# Patient Record
Sex: MALE | Race: WHITE | NOT HISPANIC OR LATINO | Employment: OTHER | URBAN - METROPOLITAN AREA
[De-identification: names, ages, dates, MRNs, and addresses within clinical notes are randomized per-mention and may not be internally consistent; named-entity substitution may affect disease eponyms.]

---

## 2023-10-11 ENCOUNTER — SPECIALTY PHARMACY (OUTPATIENT)
Dept: PHARMACY | Facility: CLINIC | Age: 88
End: 2023-10-11

## 2023-10-11 ENCOUNTER — PHARMACY VISIT (OUTPATIENT)
Dept: PHARMACY | Facility: CLINIC | Age: 88
End: 2023-10-11
Payer: COMMERCIAL

## 2023-10-11 PROCEDURE — RXMED WILLOW AMBULATORY MEDICATION CHARGE

## 2023-10-13 ENCOUNTER — HOSPITAL ENCOUNTER (OUTPATIENT)
Dept: CARDIOLOGY | Facility: HOSPITAL | Age: 88
Discharge: HOME | End: 2023-10-13
Payer: MEDICARE

## 2023-10-13 DIAGNOSIS — Z95.810 AICD (AUTOMATIC CARDIOVERTER/DEFIBRILLATOR) PRESENT: ICD-10-CM

## 2023-10-13 DIAGNOSIS — Z95.810 AICD (AUTOMATIC CARDIOVERTER/DEFIBRILLATOR) PRESENT: Primary | ICD-10-CM

## 2023-10-13 DIAGNOSIS — I47.29 VENTRICULAR TACHYCARDIA (PAROXYSMAL) (MULTI): ICD-10-CM

## 2023-10-13 PROCEDURE — 93296 REM INTERROG EVL PM/IDS: CPT

## 2023-10-13 PROCEDURE — 93295 DEV INTERROG REMOTE 1/2/MLT: CPT | Performed by: INTERNAL MEDICINE

## 2023-10-19 ENCOUNTER — APPOINTMENT (OUTPATIENT)
Dept: CARDIOLOGY | Facility: CLINIC | Age: 88
End: 2023-10-19
Payer: MEDICARE

## 2023-11-14 ENCOUNTER — PHARMACY VISIT (OUTPATIENT)
Dept: PHARMACY | Facility: CLINIC | Age: 88
End: 2023-11-14
Payer: COMMERCIAL

## 2023-11-14 ENCOUNTER — SPECIALTY PHARMACY (OUTPATIENT)
Dept: PHARMACY | Facility: CLINIC | Age: 88
End: 2023-11-14

## 2023-11-14 PROCEDURE — RXMED WILLOW AMBULATORY MEDICATION CHARGE

## 2023-11-15 ENCOUNTER — HOSPITAL ENCOUNTER (OUTPATIENT)
Dept: CARDIOLOGY | Facility: HOSPITAL | Age: 88
Discharge: HOME | End: 2023-11-15
Payer: MEDICARE

## 2023-11-15 DIAGNOSIS — Z95.810 AICD (AUTOMATIC CARDIOVERTER/DEFIBRILLATOR) PRESENT: ICD-10-CM

## 2023-11-15 DIAGNOSIS — I47.29 VENTRICULAR TACHYCARDIA (PAROXYSMAL) (MULTI): ICD-10-CM

## 2023-11-21 PROBLEM — I49.3 PVC (PREMATURE VENTRICULAR CONTRACTION): Status: ACTIVE | Noted: 2023-11-21

## 2023-11-21 PROBLEM — Z95.810 AICD (AUTOMATIC CARDIOVERTER/DEFIBRILLATOR) PRESENT: Status: ACTIVE | Noted: 2023-11-21

## 2023-11-21 PROBLEM — D50.0 ANEMIA, BLOOD LOSS: Status: ACTIVE | Noted: 2020-02-05

## 2023-11-21 PROBLEM — K21.9 GASTROESOPHAGEAL REFLUX DISEASE WITHOUT ESOPHAGITIS: Status: ACTIVE | Noted: 2020-02-04

## 2023-11-21 PROBLEM — I47.20 VENTRICULAR TACHYCARDIA (MULTI): Status: ACTIVE | Noted: 2023-11-21

## 2023-11-21 PROBLEM — N40.0 BENIGN PROSTATIC HYPERPLASIA WITHOUT LOWER URINARY TRACT SYMPTOMS: Status: ACTIVE | Noted: 2020-02-04

## 2023-11-21 PROBLEM — K92.2 GI BLEED: Status: ACTIVE | Noted: 2020-02-01

## 2023-11-21 PROBLEM — I07.1 TRICUSPID REGURGITATION: Status: ACTIVE | Noted: 2023-11-21

## 2023-11-21 PROBLEM — I10 ESSENTIAL HYPERTENSION, BENIGN: Status: ACTIVE | Noted: 2020-02-04

## 2023-11-21 PROBLEM — I50.22 CHRONIC SYSTOLIC HEART FAILURE, ACC/AHA STAGE C (MULTI): Status: ACTIVE | Noted: 2023-11-21

## 2023-11-21 PROBLEM — I50.20 HFREF (HEART FAILURE WITH REDUCED EJECTION FRACTION) (MULTI): Status: ACTIVE | Noted: 2023-11-21

## 2023-11-21 PROBLEM — I27.20 PULMONARY HYPERTENSION (MULTI): Status: ACTIVE | Noted: 2020-02-05

## 2023-11-21 PROBLEM — E78.5 HYPERLIPIDEMIA: Status: ACTIVE | Noted: 2023-11-21

## 2023-11-21 PROBLEM — I34.0 MITRAL REGURGITATION: Status: ACTIVE | Noted: 2023-11-21

## 2023-11-21 PROBLEM — J96.01 ACUTE RESPIRATORY FAILURE WITH HYPOXIA (MULTI): Status: ACTIVE | Noted: 2020-02-05

## 2023-11-21 PROBLEM — I35.1 AORTIC VALVE REGURGITATION, NONRHEUMATIC: Status: ACTIVE | Noted: 2023-11-21

## 2023-11-21 PROBLEM — I42.8 NONISCHEMIC CARDIOMYOPATHY (MULTI): Status: ACTIVE | Noted: 2023-11-21

## 2023-11-21 RX ORDER — FUROSEMIDE 20 MG/1
20 TABLET ORAL AS NEEDED
COMMUNITY

## 2023-11-21 RX ORDER — ASPIRIN 81 MG/1
1 TABLET ORAL DAILY
COMMUNITY
Start: 2021-05-27

## 2023-11-21 RX ORDER — ATORVASTATIN CALCIUM 10 MG/1
1 TABLET, FILM COATED ORAL EVERY EVENING
COMMUNITY
Start: 2021-02-13 | End: 2024-02-12

## 2023-11-21 RX ORDER — OMEPRAZOLE 20 MG/1
20 CAPSULE, DELAYED RELEASE ORAL
COMMUNITY

## 2023-11-21 RX ORDER — CELECOXIB 200 MG/1
1 CAPSULE ORAL DAILY
COMMUNITY
Start: 2021-05-10

## 2023-11-21 RX ORDER — GLUCOSAMINE/CHONDR SU A SOD 750-600 MG
1 TABLET ORAL DAILY
COMMUNITY
Start: 2022-01-11

## 2023-11-21 RX ORDER — VIT C/E/ZN/COPPR/LUTEIN/ZEAXAN 250MG-90MG
50 CAPSULE ORAL DAILY
COMMUNITY
Start: 2022-01-11

## 2023-11-21 RX ORDER — CARVEDILOL 12.5 MG/1
0.5 TABLET ORAL
COMMUNITY
Start: 2021-05-10 | End: 2023-12-11

## 2023-11-21 RX ORDER — SPIRONOLACTONE 25 MG/1
25 TABLET ORAL DAILY
COMMUNITY
End: 2023-11-28 | Stop reason: SDUPTHER

## 2023-11-21 RX ORDER — TAMSULOSIN HYDROCHLORIDE 0.4 MG/1
0.4 CAPSULE ORAL DAILY
COMMUNITY

## 2023-11-21 RX ORDER — POTASSIUM CHLORIDE 750 MG/1
20 CAPSULE, EXTENDED RELEASE ORAL DAILY PRN
COMMUNITY
Start: 2023-02-07

## 2023-11-22 ENCOUNTER — OFFICE VISIT (OUTPATIENT)
Dept: CARDIOLOGY | Facility: CLINIC | Age: 88
End: 2023-11-22
Payer: MEDICARE

## 2023-11-22 VITALS
BODY MASS INDEX: 26.21 KG/M2 | WEIGHT: 167 LBS | SYSTOLIC BLOOD PRESSURE: 132 MMHG | HEART RATE: 76 BPM | HEIGHT: 67 IN | DIASTOLIC BLOOD PRESSURE: 66 MMHG

## 2023-11-22 DIAGNOSIS — I42.8 NONISCHEMIC CARDIOMYOPATHY (MULTI): ICD-10-CM

## 2023-11-22 DIAGNOSIS — I50.20 HFREF (HEART FAILURE WITH REDUCED EJECTION FRACTION) (MULTI): ICD-10-CM

## 2023-11-22 DIAGNOSIS — E78.2 MIXED HYPERLIPIDEMIA: ICD-10-CM

## 2023-11-22 DIAGNOSIS — I47.20 VENTRICULAR TACHYCARDIA (MULTI): ICD-10-CM

## 2023-11-22 DIAGNOSIS — I10 ESSENTIAL HYPERTENSION, BENIGN: ICD-10-CM

## 2023-11-22 DIAGNOSIS — I50.22 CHRONIC SYSTOLIC HEART FAILURE, ACC/AHA STAGE C (MULTI): Primary | ICD-10-CM

## 2023-11-22 DIAGNOSIS — I34.0 NONRHEUMATIC MITRAL VALVE REGURGITATION: ICD-10-CM

## 2023-11-22 DIAGNOSIS — I35.1 AORTIC VALVE REGURGITATION, NONRHEUMATIC: ICD-10-CM

## 2023-11-22 DIAGNOSIS — Z95.810 AICD (AUTOMATIC CARDIOVERTER/DEFIBRILLATOR) PRESENT: ICD-10-CM

## 2023-11-22 DIAGNOSIS — I27.20 PULMONARY HYPERTENSION (MULTI): ICD-10-CM

## 2023-11-22 PROCEDURE — 3075F SYST BP GE 130 - 139MM HG: CPT | Performed by: INTERNAL MEDICINE

## 2023-11-22 PROCEDURE — 3078F DIAST BP <80 MM HG: CPT | Performed by: INTERNAL MEDICINE

## 2023-11-22 PROCEDURE — 1160F RVW MEDS BY RX/DR IN RCRD: CPT | Performed by: INTERNAL MEDICINE

## 2023-11-22 PROCEDURE — 1036F TOBACCO NON-USER: CPT | Performed by: INTERNAL MEDICINE

## 2023-11-22 PROCEDURE — 1159F MED LIST DOCD IN RCRD: CPT | Performed by: INTERNAL MEDICINE

## 2023-11-22 PROCEDURE — 99214 OFFICE O/P EST MOD 30 MIN: CPT | Performed by: INTERNAL MEDICINE

## 2023-11-22 NOTE — PROGRESS NOTES
Subjective   Mario Anglin is a 88 y.o. male            HPI     Patient is here for follow-up due to management for chronic systolic heart failure, history of syncope and ventricular tachyarrhythmia, status post AICD, hypertension and hyperlipidemia.  Since last time I saw him which was about a year ago he denies any change in cardiac status or symptoms.  He denies chest pain, palpitation, lightheadedness, dizziness or syncope.  His last echocardiogram continue to demonstrate severe LV systolic dysfunction in the range of 20%.  He is on excellent heart failure therapy and continue to follow with our EP department.    ASSESSMENT:      1. Chronic systolic heart failure due to nonischemic cardiomyopathy currently functional class II. He is on good medical therapy and compensated  2. History of syncope with inducible ventricular tachycardia, status post automatic implanted cardioverter defibrillator implant with no recent documentation of significant tachyarrhythmia and/or device discharge. He continues his long-term follow-up with our EP department  3. Hypertension, controlled.  4. Hyperlipidemia.  No recent lab available to review  5. Mildly overweight. With minor weight loss  6. Mild atherosclerotic coronary disease based on remote heart cath  7. previous treatment for a left arm DVT attributed to IV site. Resolved  8. Prior GI bleed with no clear etiology was found  9. Very minor symptoms of dizziness related to orthostatic hypotension  10. Moderate aortic regurgitation  11. Moderate severe mitral regurgitation evaluated by surgery at [facility] recommended conservative approach considering lack of symptoms and good functional status  12. Previous documentation secondary pulmonary hypertension improved based on recent echo     RECOMMENDATION:     1. The patient counseled regarding losing weight, exercise, and risk factor adjustment.  2. The patient was advised to continue present medical therapy. But I  "advised him if his lightheadedness worsened to notify me and will consider reducing his heart failure therapy  3. We will see the patient back in the office in 1 year with plan to repeat his lab work  4. I advised him to repeat his lab work  5. I advised him to continue his follow-up with our EP department     Review of Systems   All other systems reviewed and are negative.         Visit Vitals  /66 (BP Location: Right arm, Patient Position: Sitting)   Pulse 76   Ht 1.702 m (5' 7\")   Wt 75.8 kg (167 lb)   BMI 26.16 kg/m²   Smoking Status Never   BSA 1.89 m²        Objective   Physical Exam  Constitutional:       Appearance: Normal appearance. He is normal weight.   HENT:      Nose: Nose normal.   Neck:      Vascular: No carotid bruit.   Cardiovascular:      Rate and Rhythm: Normal rate.      Pulses: Normal pulses.      Heart sounds: Normal heart sounds.   Pulmonary:      Effort: Pulmonary effort is normal.   Abdominal:      General: Bowel sounds are normal.      Palpations: Abdomen is soft.   Genitourinary:     Rectum: Normal.   Musculoskeletal:         General: Normal range of motion.      Cervical back: Normal range of motion.      Right lower leg: No edema.      Left lower leg: No edema.   Skin:     General: Skin is warm and dry.   Neurological:      General: No focal deficit present.      Mental Status: He is alert.   Psychiatric:         Mood and Affect: Mood normal.         Behavior: Behavior normal.         Thought Content: Thought content normal.         Judgment: Judgment normal.         Current Medications    Current Outpatient Medications:     aspirin 81 mg EC tablet, Take 1 tablet (81 mg) by mouth once daily., Disp: , Rfl:     atorvastatin (Lipitor) 10 mg tablet, Take 1 tablet (10 mg) by mouth once daily in the evening., Disp: , Rfl:     carvedilol (Coreg) 12.5 mg tablet, Take 0.5 tablets (6.25 mg) by mouth 2 times a day with meals., Disp: , Rfl:     celecoxib (CeleBREX) 200 mg capsule, Take 1 " capsule (200 mg) by mouth once daily., Disp: , Rfl:     cholecalciferol (Vitamin D-3) 25 MCG (1000 UT) capsule, Take 2 capsules (50 mcg) by mouth once daily., Disp: , Rfl:     dapagliflozin propanediol (Farxiga) 10 mg, TAKE ONE (1) TABLET BY MOUTH ONCE DAILY., Disp: 90 tablet, Rfl: 3    furosemide (Lasix) 20 mg tablet, Take 1 tablet (20 mg) by mouth once daily., Disp: , Rfl:     omeprazole (PriLOSEC) 20 mg DR capsule, Take 1 capsule (20 mg) by mouth once daily in the morning. Take before meals., Disp: , Rfl:     potassium chloride ER (Micro-K) 10 mEq ER capsule, Take 2 capsules (20 mEq) by mouth once daily. Take one tablet daily with Furosemide, Disp: , Rfl:     sacubitriL-valsartan (Entresto)  mg tablet, TAKE ONE (1) TABLET BY MOUTH TWICE DAILY., Disp: 180 tablet, Rfl: 3    spironolactone (Aldactone) 25 mg tablet, Take 1 tablet (25 mg) by mouth once daily., Disp: , Rfl:     tamsulosin (Flomax) 0.4 mg 24 hr capsule, Take 1 capsule (0.4 mg) by mouth once daily., Disp: , Rfl:     vitamin E mixed 400 unit capsule, Take 1 capsule by mouth once daily., Disp: , Rfl:                      Assessment/Plan   1. Chronic systolic heart failure, ACC/AHA stage C (CMS/HCC)  Basic Metabolic Panel    Follow Up In Cardiology    Basic Metabolic Panel      2. Nonrheumatic mitral valve regurgitation  Basic Metabolic Panel    Follow Up In Cardiology    Basic Metabolic Panel      3. Aortic valve regurgitation, nonrheumatic  Follow Up In Cardiology      4. Essential hypertension, benign  Basic Metabolic Panel    Basic Metabolic Panel      5. HFrEF (heart failure with reduced ejection fraction) (CMS/HCC)        6. Mixed hyperlipidemia  Alanine Aminotransferase    Aspartate Aminotransferase    Lipid Panel    Alanine Aminotransferase    Aspartate Aminotransferase    Lipid Panel      7. Nonischemic cardiomyopathy (CMS/HCC)        8. AICD (automatic cardioverter/defibrillator) present        9. Pulmonary hypertension (CMS/HCC)        10.  BMI 26.0-26.9,adult        11. Ventricular tachycardia (CMS/HCC)

## 2023-11-28 DIAGNOSIS — I50.20 HFREF (HEART FAILURE WITH REDUCED EJECTION FRACTION) (MULTI): Primary | ICD-10-CM

## 2023-11-28 RX ORDER — SPIRONOLACTONE 25 MG/1
25 TABLET ORAL DAILY
Qty: 30 TABLET | Refills: 0 | Status: SHIPPED | OUTPATIENT
Start: 2023-11-28 | End: 2024-01-04 | Stop reason: SDUPTHER

## 2023-12-05 ENCOUNTER — APPOINTMENT (OUTPATIENT)
Dept: CARDIOLOGY | Facility: HOSPITAL | Age: 88
End: 2023-12-05
Payer: MEDICARE

## 2023-12-05 ENCOUNTER — HOSPITAL ENCOUNTER (OUTPATIENT)
Dept: CARDIOLOGY | Facility: HOSPITAL | Age: 88
Discharge: HOME | End: 2023-12-05
Payer: MEDICARE

## 2023-12-05 DIAGNOSIS — I47.29 VENTRICULAR TACHYCARDIA (PAROXYSMAL) (MULTI): ICD-10-CM

## 2023-12-05 DIAGNOSIS — Z95.810 AICD (AUTOMATIC CARDIOVERTER/DEFIBRILLATOR) PRESENT: ICD-10-CM

## 2023-12-07 ENCOUNTER — SPECIALTY PHARMACY (OUTPATIENT)
Dept: PHARMACY | Facility: CLINIC | Age: 88
End: 2023-12-07

## 2023-12-11 DIAGNOSIS — I50.20 HFREF (HEART FAILURE WITH REDUCED EJECTION FRACTION) (MULTI): Primary | ICD-10-CM

## 2023-12-11 RX ORDER — CARVEDILOL 12.5 MG/1
12.5 TABLET ORAL
Qty: 180 TABLET | Refills: 0 | Status: SHIPPED | OUTPATIENT
Start: 2023-12-11 | End: 2024-01-23 | Stop reason: SDUPTHER

## 2023-12-19 ENCOUNTER — HOSPITAL ENCOUNTER (OUTPATIENT)
Dept: CARDIOLOGY | Facility: HOSPITAL | Age: 88
Discharge: HOME | End: 2023-12-19
Payer: MEDICARE

## 2023-12-19 DIAGNOSIS — Z95.810 AICD (AUTOMATIC CARDIOVERTER/DEFIBRILLATOR) PRESENT: ICD-10-CM

## 2023-12-19 DIAGNOSIS — I47.29 VENTRICULAR TACHYCARDIA (PAROXYSMAL) (MULTI): ICD-10-CM

## 2023-12-19 PROCEDURE — 93296 REM INTERROG EVL PM/IDS: CPT

## 2023-12-19 PROCEDURE — 93295 DEV INTERROG REMOTE 1/2/MLT: CPT | Performed by: INTERNAL MEDICINE

## 2023-12-27 PROCEDURE — RXMED WILLOW AMBULATORY MEDICATION CHARGE

## 2023-12-28 ENCOUNTER — PHARMACY VISIT (OUTPATIENT)
Dept: PHARMACY | Facility: CLINIC | Age: 88
End: 2023-12-28
Payer: COMMERCIAL

## 2024-01-04 DIAGNOSIS — I50.20 HFREF (HEART FAILURE WITH REDUCED EJECTION FRACTION) (MULTI): ICD-10-CM

## 2024-01-04 RX ORDER — SPIRONOLACTONE 25 MG/1
25 TABLET ORAL DAILY
Qty: 90 TABLET | Refills: 0 | Status: SHIPPED | OUTPATIENT
Start: 2024-01-04 | End: 2024-03-29 | Stop reason: SDUPTHER

## 2024-01-23 ENCOUNTER — OFFICE VISIT (OUTPATIENT)
Dept: CARDIOLOGY | Facility: CLINIC | Age: 89
End: 2024-01-23
Payer: MEDICARE

## 2024-01-23 ENCOUNTER — HOSPITAL ENCOUNTER (OUTPATIENT)
Dept: CARDIOLOGY | Facility: HOSPITAL | Age: 89
Discharge: HOME | End: 2024-01-23
Payer: MEDICARE

## 2024-01-23 VITALS
HEART RATE: 71 BPM | HEIGHT: 64 IN | DIASTOLIC BLOOD PRESSURE: 60 MMHG | WEIGHT: 164 LBS | SYSTOLIC BLOOD PRESSURE: 128 MMHG | BODY MASS INDEX: 28 KG/M2

## 2024-01-23 DIAGNOSIS — R55 NEAR SYNCOPE: ICD-10-CM

## 2024-01-23 DIAGNOSIS — Z71.89 ENCOUNTER FOR MEDICATION REVIEW AND COUNSELING: ICD-10-CM

## 2024-01-23 DIAGNOSIS — Z95.810 AICD (AUTOMATIC CARDIOVERTER/DEFIBRILLATOR) PRESENT: Primary | ICD-10-CM

## 2024-01-23 DIAGNOSIS — Z78.9 NEVER SMOKED CIGARETTES: ICD-10-CM

## 2024-01-23 DIAGNOSIS — I47.20 VENTRICULAR TACHYCARDIA (MULTI): ICD-10-CM

## 2024-01-23 DIAGNOSIS — I34.0 NONRHEUMATIC MITRAL VALVE REGURGITATION: ICD-10-CM

## 2024-01-23 DIAGNOSIS — I07.1 TRICUSPID VALVE INSUFFICIENCY, UNSPECIFIED ETIOLOGY: ICD-10-CM

## 2024-01-23 DIAGNOSIS — I42.8 NONISCHEMIC CARDIOMYOPATHY (MULTI): ICD-10-CM

## 2024-01-23 DIAGNOSIS — Z95.810 ICD (IMPLANTABLE CARDIOVERTER-DEFIBRILLATOR) IN PLACE: ICD-10-CM

## 2024-01-23 DIAGNOSIS — I50.20 HFREF (HEART FAILURE WITH REDUCED EJECTION FRACTION) (MULTI): ICD-10-CM

## 2024-01-23 DIAGNOSIS — I47.29 PAROXYSMAL VENTRICULAR TACHYCARDIA (MULTI): ICD-10-CM

## 2024-01-23 DIAGNOSIS — Z71.89 ENCOUNTER TO DISCUSS TREATMENT OPTIONS: ICD-10-CM

## 2024-01-23 DIAGNOSIS — I49.3 PVC (PREMATURE VENTRICULAR CONTRACTION): ICD-10-CM

## 2024-01-23 DIAGNOSIS — E78.2 MIXED HYPERLIPIDEMIA: ICD-10-CM

## 2024-01-23 PROCEDURE — 93000 ELECTROCARDIOGRAM COMPLETE: CPT | Performed by: INTERNAL MEDICINE

## 2024-01-23 PROCEDURE — 99214 OFFICE O/P EST MOD 30 MIN: CPT | Performed by: INTERNAL MEDICINE

## 2024-01-23 PROCEDURE — 93290 INTERROG DEV EVAL ICPMS IP: CPT | Performed by: INTERNAL MEDICINE

## 2024-01-23 PROCEDURE — 1036F TOBACCO NON-USER: CPT | Performed by: INTERNAL MEDICINE

## 2024-01-23 PROCEDURE — 3074F SYST BP LT 130 MM HG: CPT | Performed by: INTERNAL MEDICINE

## 2024-01-23 PROCEDURE — 3078F DIAST BP <80 MM HG: CPT | Performed by: INTERNAL MEDICINE

## 2024-01-23 PROCEDURE — 93283 PRGRMG EVAL IMPLANTABLE DFB: CPT

## 2024-01-23 PROCEDURE — 1159F MED LIST DOCD IN RCRD: CPT | Performed by: INTERNAL MEDICINE

## 2024-01-23 PROCEDURE — 1160F RVW MEDS BY RX/DR IN RCRD: CPT | Performed by: INTERNAL MEDICINE

## 2024-01-23 PROCEDURE — 93283 PRGRMG EVAL IMPLANTABLE DFB: CPT | Performed by: INTERNAL MEDICINE

## 2024-01-23 RX ORDER — CARVEDILOL 12.5 MG/1
18.75 TABLET ORAL
Qty: 270 TABLET | Refills: 3 | Status: SHIPPED | OUTPATIENT
Start: 2024-01-23 | End: 2025-01-17

## 2024-01-23 RX ORDER — LANOLIN ALCOHOL/MO/W.PET/CERES
400 CREAM (GRAM) TOPICAL DAILY
Qty: 90 TABLET | Refills: 3 | Status: SHIPPED | OUTPATIENT
Start: 2024-01-23 | End: 2025-01-22

## 2024-01-23 ASSESSMENT — ENCOUNTER SYMPTOMS
IRREGULAR HEARTBEAT: 0
NEAR-SYNCOPE: 0
DYSPNEA ON EXERTION: 0
SHORTNESS OF BREATH: 0
SYNCOPE: 0
PALPITATIONS: 0
ORTHOPNEA: 0

## 2024-01-23 NOTE — PROGRESS NOTES
"Chief Complaint:   Follow-up (1 year)     History Of Present Illness:    Mario Anglin is a 88 y.o. male presenting with followup.  He is accompanied by family.    He felt unwell on Jan 7; there were no arrhythmias detected on device check on that date.    He did have VT on 12/30, it had fast heart rate and spontaneously converted.    He reports that when refills occurred, his carvedilol was 12.5 mg BID.  Before the refills, he had taken 18.75 mg BID.  Otherwise there has been no change in his medications.     Last Recorded Vitals:  Vitals:    01/23/24 1320   BP: 128/60   Pulse: 71   Weight: 74.4 kg (164 lb)   Height: 1.626 m (5' 4\")       Past Medical History:  See List     Past Surgical History:  See List    Social History:  He reports that he has never smoked. He has never used smokeless tobacco. He reports that he does not drink alcohol and does not use drugs.    Family History:  Family History   Problem Relation Name Age of Onset    Cancer Father          Allergies:  Patient has no known allergies.    Outpatient Medications:  Current Outpatient Medications   Medication Instructions    aspirin 81 mg EC tablet 1 tablet, oral, Daily    atorvastatin (Lipitor) 10 mg tablet 1 tablet, oral, Every evening    carvedilol (COREG) 12.5 mg, oral, 2 times daily with meals    celecoxib (CeleBREX) 200 mg capsule 1 capsule, oral, Daily    cholecalciferol (VITAMIN D-3) 50 mcg, oral, Daily    dapagliflozin propanediol (Farxiga) 10 mg TAKE ONE (1) TABLET BY MOUTH ONCE DAILY.    furosemide (LASIX) 20 mg, oral, As needed    omeprazole (PRILOSEC) 20 mg, oral, Daily before breakfast    potassium chloride ER (Micro-K) 10 mEq ER capsule 20 mEq, oral, Daily PRN, Take one tablet daily with Furosemide<BR>    sacubitriL-valsartan (Entresto)  mg tablet TAKE ONE (1) TABLET BY MOUTH TWICE DAILY.    spironolactone (ALDACTONE) 25 mg, oral, Daily, Must be seen by Dr. Chen for further refills. Call [phone]    tamsulosin (FLOMAX) " "0.4 mg, oral, Daily    vitamin E mixed 400 unit capsule 1 capsule, oral, Daily   Review of Systems   Constitutional: Positive for malaise/fatigue.   Cardiovascular:  Negative for chest pain, dyspnea on exertion, irregular heartbeat, near-syncope, orthopnea, palpitations and syncope.   Respiratory:  Negative for shortness of breath.    All other systems reviewed and are negative.    Physical Exam:  Constitutional:       General: Awake.      Appearance: Normal and healthy appearance. Well-developed and not in distress.   Neck:      Vascular: No JVR. JVD normal.   Pulmonary:      Effort: Pulmonary effort is normal.      Breath sounds: Normal breath sounds. No wheezing. No rhonchi. No rales.   Chest:      Chest wall: Not tender to palpatation.   Cardiovascular:      PMI at left midclavicular line. Normal rate. Regular rhythm. Normal S1. Normal S2.       Murmurs: There is no murmur.      No gallop.  No click. No rub.   Pulses:     Intact distal pulses.   Edema:     Peripheral edema absent.   Abdominal:      Tenderness: There is no abdominal tenderness.   Musculoskeletal: Normal range of motion.         General: No tenderness. Skin:     General: Skin is warm and dry.   Neurological:      General: No focal deficit present.      Mental Status: Alert and oriented to person, place and time.            Last Labs:  CBC -  No results found for: \"WBC\", \"HGB\", \"HCT\", \"MCV\", \"PLT\"    CMP -  Lab Results   Component Value Date    CALCIUM 8.8 07/02/2021       LIPID PANEL -   No results found for: \"CHOL\", \"TRIG\", \"HDL\", \"CHHDL\", \"LDLF\", \"VLDL\", \"NHDL\"    RENAL FUNCTION PANEL -   Lab Results   Component Value Date    GLUCOSE 78 07/02/2021     07/02/2021    K 4.4 07/02/2021     07/02/2021    CO2 29 07/02/2021    ANIONGAP 11 07/02/2021    BUN 25 (H) 07/02/2021    CREATININE 1.35 (H) 07/02/2021    CALCIUM 8.8 07/02/2021        Lab Results   Component Value Date     (H) 05/27/2021       Last Cardiology " Tests:  ECG:  Today.  Appropriate pacing.  Right axis deviation.  Corrected QT interval 460 ms  Device check.  Medtronic DD BB 1 D4 ICD.  Estimated longevity device 9 months.  Episodes of ventricular tachycardia and 1 episode of fast VT that self terminated  No shocks    Lab review: I have personally reviewed the laboratory result(s) see above    Assessment/Plan   Diagnoses and all orders for this visit:  PVC (premature ventricular contraction)  Nonischemic cardiomyopathy (CMS/HCC)  Nonrheumatic mitral valve regurgitation  Tricuspid valve insufficiency, unspecified etiology  AICD (automatic cardioverter/defibrillator) present  Mixed hyperlipidemia  Ventricular tachycardia (CMS/HCC)  HFrEF (heart failure with reduced ejection fraction) (CMS/HCC)  Near syncope  BMI 28.0-28.9,adult  Never smoked cigarettes  Encounter for medication review and counseling  Encounter to discuss treatment options    Joselin Hester RN    Nonischemic cardiomyopathy. Valvular heart disease NYHA II C heart failure. Narrow QRS. Chronic. Stable. LVEF 29% by nuclear stress testing 2020. LVEF 20 to 25% and 20% by echocardiogram 2022. Valvular heart disease with moderate to severe MR, moderate TR, trace to mild AI, and LV dilation. Follows with cardiology.  Nonsustained ventricular tachycardia. Will increase carvedilol to 18.75 mg twice daily.   Orthostatic dizziness. Continue to stagger timing of doses of Entresto and carvedilol given intermittent orthostatic dizziness.  Primary prevention ICD and abnormal AV adali function, abnormal SA adali function by EP testing 2015. Status post ICD 2015. No recurrence of near syncope after defibrillator implant  Medtronic DD BB 1 D4 ICD. Chronic. On field alert. Discussed again the reprogramming of the device and field alert. Discussed standard of care for follow-up. Continue quarterly device checks, alternate remote check with clinic checks paired with EP OV.  Hypertension with LVH. Benign, chronic,  controlled. Stable. Reviewed ECG. Reviewed medications. Continue medications. Discussed refills.  Osteoarthritis limits activity. Stable. Chronic.  Overweight        AHA recommendations for exercise, diet, and behavioral modification reviewed with pt.     The patient and I discussed the mechanism of arrhythmia, ECG, device check, field alert device, VT, half life of medications, indications for and types of medications, discussion if and what medication refills needed, treatment options, risks, benefits, and imponderables. American Heart Association lifestyle changes and behavioral modification discussed. All questions answered in detail. Counseling over 50% visit regarding above. Patient appreciative of care.

## 2024-01-23 NOTE — PATIENT INSTRUCTIONS
Continue same medications/treatment.  Patient educated on proper medication use.  Patient educated on risk factor modification.  Please bring any lab results from other providers/physicians to your next appointment.    Please bring all medicines, vitamins, and herbal supplements with you when you come to the office.    Prescriptions will not be filled unless you are compliant with your follow up appointments or have a follow up appointment scheduled as per instruction of your physician. Refills should be requested at the time of your visit.    START magnesium oxide 400mg daily  INCREASE carvedilol to 1.5 tablets twice daily (18.75mg twice daily)  Follow up with Dr. Andre in 3 months with device check  OBTAIN labs within the next week     HARVEY AUGUSTIN RN, AM SCRIBING FOR AND IN THE PRESENCE OF DR. VANIA ANDRE MD, FACC, FACP, PS

## 2024-02-10 DIAGNOSIS — E78.2 MIXED HYPERLIPIDEMIA: ICD-10-CM

## 2024-02-12 RX ORDER — ATORVASTATIN CALCIUM 10 MG/1
10 TABLET, FILM COATED ORAL NIGHTLY
Qty: 90 TABLET | Refills: 3 | Status: SHIPPED | OUTPATIENT
Start: 2024-02-12 | End: 2025-02-11

## 2024-02-19 ENCOUNTER — SPECIALTY PHARMACY (OUTPATIENT)
Dept: PHARMACY | Facility: CLINIC | Age: 89
End: 2024-02-19

## 2024-02-19 PROCEDURE — RXMED WILLOW AMBULATORY MEDICATION CHARGE

## 2024-02-20 ENCOUNTER — PHARMACY VISIT (OUTPATIENT)
Dept: PHARMACY | Facility: CLINIC | Age: 89
End: 2024-02-20
Payer: COMMERCIAL

## 2024-02-28 ENCOUNTER — HOSPITAL ENCOUNTER (OUTPATIENT)
Dept: CARDIOLOGY | Facility: HOSPITAL | Age: 89
Discharge: HOME | End: 2024-02-28
Payer: MEDICARE

## 2024-02-28 DIAGNOSIS — I47.29 VENTRICULAR TACHYCARDIA (PAROXYSMAL) (MULTI): ICD-10-CM

## 2024-02-28 DIAGNOSIS — Z95.810 AICD (AUTOMATIC CARDIOVERTER/DEFIBRILLATOR) PRESENT: ICD-10-CM

## 2024-03-14 LAB
NON-UH HIE ALANINE AMINOTRANSFERASE: 16 U/L (ref 7–52)
NON-UH HIE ANION GAP: 7.4 (ref 6–15)
NON-UH HIE ASPARTATE AMINO TRANSFERASE: 17 U/L (ref 13–39)
NON-UH HIE BLOOD UREA NITROGEN: 33 MG/DL (ref 7–25)
NON-UH HIE CALCIUM: 9.1 MG/DL (ref 8.6–10.3)
NON-UH HIE CARBON DIOXIDE: 29.8 MMOL/L (ref 21–31)
NON-UH HIE CHLORIDE: 109 MMOL/L (ref 98–107)
NON-UH HIE CHOL/HDL RATIO: 2.9
NON-UH HIE CHOLESTEROL: 124 MG/DL (ref 140–200)
NON-UH HIE CREATININE: 1.38 MG/DL (ref 0.7–1.3)
NON-UH HIE ESTIMATED GFR: 49.8
NON-UH HIE GLUCOSE: 99 MG/DL (ref 70–100)
NON-UH HIE HDL CHOLESTEROL: 43 MG/DL (ref 23–92)
NON-UH HIE LDL CHOLESTEROL,CALCULATED: 57 MG/DL (ref 0–100)
NON-UH HIE POTASSIUM: 5.2 MMOL/L (ref 3.5–5.1)
NON-UH HIE SODIUM: 141 MMOL/L (ref 136–145)
NON-UH HIE TRIGLYCERIDE W/REFLEX: 119 MG/DL (ref 0–149)
NON-UH HIE VLDL CHOLESTEROL: 23 MG/DL

## 2024-03-15 ENCOUNTER — SPECIALTY PHARMACY (OUTPATIENT)
Dept: PHARMACY | Facility: CLINIC | Age: 89
End: 2024-03-15

## 2024-03-15 DIAGNOSIS — I50.20 HFREF (HEART FAILURE WITH REDUCED EJECTION FRACTION) (MULTI): Primary | ICD-10-CM

## 2024-03-18 RX ORDER — SACUBITRIL AND VALSARTAN 97; 103 MG/1; MG/1
1 TABLET, FILM COATED ORAL 2 TIMES DAILY
Qty: 180 TABLET | Refills: 3 | Status: SHIPPED | OUTPATIENT
Start: 2024-03-18 | End: 2025-03-17

## 2024-03-18 RX ORDER — DAPAGLIFLOZIN 10 MG/1
10 TABLET, FILM COATED ORAL DAILY
Qty: 90 TABLET | Refills: 3 | Status: SHIPPED | OUTPATIENT
Start: 2024-03-18 | End: 2025-03-17

## 2024-03-28 ENCOUNTER — HOSPITAL ENCOUNTER (OUTPATIENT)
Dept: CARDIOLOGY | Facility: HOSPITAL | Age: 89
Discharge: HOME | End: 2024-03-28
Payer: MEDICARE

## 2024-03-28 DIAGNOSIS — I47.29 VENTRICULAR TACHYCARDIA (PAROXYSMAL) (MULTI): ICD-10-CM

## 2024-03-28 DIAGNOSIS — Z95.810 AICD (AUTOMATIC CARDIOVERTER/DEFIBRILLATOR) PRESENT: ICD-10-CM

## 2024-03-29 ENCOUNTER — TELEPHONE (OUTPATIENT)
Dept: CARDIOLOGY | Facility: HOSPITAL | Age: 89
End: 2024-03-29
Payer: COMMERCIAL

## 2024-03-29 DIAGNOSIS — I50.20 HFREF (HEART FAILURE WITH REDUCED EJECTION FRACTION) (MULTI): ICD-10-CM

## 2024-03-29 RX ORDER — SPIRONOLACTONE 25 MG/1
25 TABLET ORAL DAILY
Qty: 90 TABLET | Refills: 0 | Status: SHIPPED | OUTPATIENT
Start: 2024-03-29 | End: 2024-06-27

## 2024-04-05 PROCEDURE — RXMED WILLOW AMBULATORY MEDICATION CHARGE

## 2024-04-10 ENCOUNTER — PHARMACY VISIT (OUTPATIENT)
Dept: PHARMACY | Facility: CLINIC | Age: 89
End: 2024-04-10
Payer: COMMERCIAL

## 2024-04-25 ENCOUNTER — APPOINTMENT (OUTPATIENT)
Dept: CARDIOLOGY | Facility: HOSPITAL | Age: 89
End: 2024-04-25
Payer: COMMERCIAL

## 2024-04-30 ENCOUNTER — OFFICE VISIT (OUTPATIENT)
Dept: CARDIOLOGY | Facility: CLINIC | Age: 89
End: 2024-04-30
Payer: MEDICARE

## 2024-04-30 ENCOUNTER — HOSPITAL ENCOUNTER (OUTPATIENT)
Dept: CARDIOLOGY | Facility: HOSPITAL | Age: 89
Discharge: HOME | End: 2024-04-30
Payer: MEDICARE

## 2024-04-30 VITALS
WEIGHT: 163 LBS | DIASTOLIC BLOOD PRESSURE: 60 MMHG | BODY MASS INDEX: 26.2 KG/M2 | HEART RATE: 74 BPM | SYSTOLIC BLOOD PRESSURE: 110 MMHG | HEIGHT: 66 IN

## 2024-04-30 DIAGNOSIS — I47.20 VENTRICULAR TACHYCARDIA (MULTI): ICD-10-CM

## 2024-04-30 DIAGNOSIS — R55 NEAR SYNCOPE: ICD-10-CM

## 2024-04-30 DIAGNOSIS — I49.3 PVC (PREMATURE VENTRICULAR CONTRACTION): ICD-10-CM

## 2024-04-30 DIAGNOSIS — E78.2 MIXED HYPERLIPIDEMIA: ICD-10-CM

## 2024-04-30 DIAGNOSIS — Z95.810 AICD (AUTOMATIC CARDIOVERTER/DEFIBRILLATOR) PRESENT: ICD-10-CM

## 2024-04-30 DIAGNOSIS — I50.22 CHRONIC SYSTOLIC HEART FAILURE, ACC/AHA STAGE C (MULTI): ICD-10-CM

## 2024-04-30 DIAGNOSIS — I42.8 NONISCHEMIC CARDIOMYOPATHY (MULTI): ICD-10-CM

## 2024-04-30 DIAGNOSIS — I34.0 NONRHEUMATIC MITRAL VALVE REGURGITATION: ICD-10-CM

## 2024-04-30 DIAGNOSIS — Z78.9 NEVER SMOKED CIGARETTES: ICD-10-CM

## 2024-04-30 DIAGNOSIS — Z95.810 AICD (AUTOMATIC CARDIOVERTER/DEFIBRILLATOR) PRESENT: Primary | ICD-10-CM

## 2024-04-30 DIAGNOSIS — I07.1 TRICUSPID VALVE INSUFFICIENCY, UNSPECIFIED ETIOLOGY: ICD-10-CM

## 2024-04-30 PROCEDURE — 93283 PRGRMG EVAL IMPLANTABLE DFB: CPT

## 2024-04-30 PROCEDURE — 1124F ACP DISCUSS-NO DSCNMKR DOCD: CPT | Performed by: INTERNAL MEDICINE

## 2024-04-30 PROCEDURE — 93283 PRGRMG EVAL IMPLANTABLE DFB: CPT | Performed by: INTERNAL MEDICINE

## 2024-04-30 PROCEDURE — 99215 OFFICE O/P EST HI 40 MIN: CPT | Performed by: INTERNAL MEDICINE

## 2024-04-30 PROCEDURE — 3078F DIAST BP <80 MM HG: CPT | Performed by: INTERNAL MEDICINE

## 2024-04-30 PROCEDURE — 3074F SYST BP LT 130 MM HG: CPT | Performed by: INTERNAL MEDICINE

## 2024-04-30 PROCEDURE — 1159F MED LIST DOCD IN RCRD: CPT | Performed by: INTERNAL MEDICINE

## 2024-04-30 PROCEDURE — 93000 ELECTROCARDIOGRAM COMPLETE: CPT | Mod: DISTINCT PROCEDURAL SERVICE | Performed by: INTERNAL MEDICINE

## 2024-04-30 PROCEDURE — 1036F TOBACCO NON-USER: CPT | Performed by: INTERNAL MEDICINE

## 2024-04-30 ASSESSMENT — ENCOUNTER SYMPTOMS
PALPITATIONS: 0
DIZZINESS: 1
DYSPNEA ON EXERTION: 0

## 2024-04-30 NOTE — PATIENT INSTRUCTIONS
Continue same medications/treatment.  Patient educated on proper medication use.  Patient educated on risk factor modification.  Please bring any lab results from other providers/physicians to your next appointment.    Please bring all medicines, vitamins, and herbal supplements with you when you come to the office.    Prescriptions will not be filled unless you are compliant with your follow up appointments or have a follow up appointment scheduled as per instruction of your physician. Refills should be requested at the time of your visit.    INCREASE your carvedilol to 1.5 tablet (18.75mg) twice daily  If your systolic BP is under 100, hold your morning blood pressure medications until your BP rises  Follow up with Dr. Valdez in 4 months with device check    I, HARVEY FU RN, AM SCRIBING FOR AND IN THE PRESENCE OF DR. VANIA VALDEZ MD, FACC, FACP, FHRS

## 2024-04-30 NOTE — PROGRESS NOTES
"Chief Complaint:   Pacemaker Check and Follow-up (6 months)     History Of Present Illness:    Mario Anglin is a 88 y.o. male presenting with follow-up.    He feels okay.  He denies any palpitation, lightheadedness, near-syncope, or syncope.    His device site is unchanged    His device is approaching DAVID.  Shared decision making performed.  When device needs to be replaced in future, he opts to have device replaced by me at [place].     Last Recorded Vitals:  Vitals:    04/30/24 1337   BP: 110/60   BP Location: Right arm   Patient Position: Sitting   BP Cuff Size: Adult   Pulse: 74   Weight: 73.9 kg (163 lb)   Height: 1.676 m (5' 6\")       Past Medical History:  See List     Past Surgical History:  See List       Social History:  He reports that he has never smoked. He has never used smokeless tobacco. He reports that he does not drink alcohol and does not use drugs.    Family History:  Family History   Problem Relation Name Age of Onset    Cancer Father Vikas anglin         Allergies:  Patient has no known allergies.    Outpatient Medications:  Current Outpatient Medications   Medication Instructions    aspirin 81 mg EC tablet 1 tablet, oral, Daily    atorvastatin (LIPITOR) 10 mg, oral, Nightly    B complex-vitamin C-folic acid (Nephro-Sonja Rx) 1- mg-mg-mcg tablet 1 tablet, oral, Daily with breakfast    B complex-vitamin C-folic acid (Nephro-Sonja Rx) 1- mg-mg-mcg tablet 1 tablet, oral, Daily with breakfast    carvedilol (COREG) 18.75 mg, oral, 2 times daily with meals    celecoxib (CeleBREX) 200 mg capsule 1 capsule, oral, Daily    cholecalciferol (VITAMIN D-3) 50 mcg, oral, Daily    dapagliflozin propanediol (Farxiga) 10 mg TAKE ONE (1) TABLET BY MOUTH ONCE DAILY.    furosemide (LASIX) 20 mg, oral, As needed    magnesium oxide (MAG-OX) 400 mg, oral, Daily    omeprazole (PRILOSEC) 20 mg, oral, Daily before breakfast    potassium chloride ER (Micro-K) 10 mEq ER capsule 20 mEq, oral, Daily PRN, Take " "one tablet daily with Furosemide<BR>    sacubitriL-valsartan (Entresto)  mg tablet TAKE ONE (1) TABLET BY MOUTH TWICE DAILY.    spironolactone (ALDACTONE) 25 mg, oral, Daily, Must be seen by Dr. Chen for further refills. Call [phone]    tamsulosin (FLOMAX) 0.4 mg, oral, Daily    vitamin E mixed 400 unit capsule 1 capsule, oral, Daily   Review of Systems   Cardiovascular:  Negative for chest pain, dyspnea on exertion and palpitations.   Neurological:  Positive for dizziness.   All other systems reviewed and are negative.        Physical Exam:  Constitutional:       General: Awake.      Appearance: Normal and healthy appearance. Well-developed and not in distress.   Neck:      Vascular: No JVR. JVD normal.   Pulmonary:      Effort: Pulmonary effort is normal.      Breath sounds: Normal breath sounds. No wheezing. No rhonchi. No rales.   Chest:      Chest wall: Not tender to palpatation.      Comments: Left sided device pocket- healed and well approximated. No swelling or hematoma      Cardiovascular:      PMI at left midclavicular line. Normal rate. Regular rhythm. Normal S1. Normal S2.       Murmurs: There is no murmur.      No gallop.  No click. No rub.   Pulses:     Intact distal pulses.   Edema:     Peripheral edema absent.   Abdominal:      Tenderness: There is no abdominal tenderness.   Musculoskeletal: Normal range of motion.         General: No tenderness. Skin:     General: Skin is warm and dry.   Neurological:      General: No focal deficit present.      Mental Status: Alert and oriented to person, place and time.            Last Labs:  CBC -  No results found for: \"WBC\", \"HGB\", \"HCT\", \"MCV\", \"PLT\"    CMP -  Lab Results   Component Value Date    CALCIUM 8.8 07/02/2021       LIPID PANEL -   No results found for: \"CHOL\", \"TRIG\", \"HDL\", \"CHHDL\", \"LDLF\", \"VLDL\", \"NHDL\"    RENAL FUNCTION PANEL -   Lab Results   Component Value Date    GLUCOSE 78 07/02/2021     07/02/2021    K 4.4 07/02/2021 "     07/02/2021    CO2 29 07/02/2021    ANIONGAP 11 07/02/2021    BUN 25 (H) 07/02/2021    CREATININE 1.35 (H) 07/02/2021    CALCIUM 8.8 07/02/2021        Lab Results   Component Value Date     (H) 05/27/2021       Last Cardiology Tests:  ECG:  ECG 12 Lead 01/23/2024  ECG today.  Appropriate pacing.  Normal axis.  Corrected QT interval 440 ms  Device check today.  Medtronic DD BB 1 D4 ICD.  Estimated longevity device 7 months.  82% atrial pacing 0.5% ventricular pacing.  Brief SVT.  1 rapid VT in the VF zone.  Lab review: I have personally reviewed the laboratory result(s) see above    Assessment/Plan   Diagnoses and all orders for this visit:  AICD (automatic cardioverter/defibrillator) present  -     Cardiac Device Check - In Clinic  PVC (premature ventricular contraction)  Nonischemic cardiomyopathy (Multi)  Nonrheumatic mitral valve regurgitation  Tricuspid valve insufficiency, unspecified etiology  Mixed hyperlipidemia  Ventricular tachycardia (Multi)  Chronic systolic heart failure, ACC/AHA stage C (Multi)  Near syncope  Never smoked cigarettes  BMI 26.0-26.9,adult    Joselin Hester RN    Nonischemic cardiomyopathy. Valvular heart disease NYHA II C heart failure. Narrow QRS. Chronic. Stable. LVEF 29% by nuclear stress testing 2020. LVEF 20 to 25% and 20% by echocardiogram 2022. Valvular heart disease with moderate to severe MR, moderate TR, trace to mild AI, and LV dilation. Follows with cardiology.  Nonsustained ventricular tachycardia.  70 send he did not increase his carvedilol since last visit.  Increase carvedilol to 18.75 mg twice daily.  Refills reviewed.  Orthostatic dizziness.  Improved with staggering doses of medications that are vasoactive.  Primary prevention ICD and abnormal AV adali function, abnormal SA adali function by EP testing 2015. Status post ICD 2015.   Medtronic DD BB 1 D4 ICD. Chronic. On field alert.  Shared decision making performed.  [place] decision tool.  All  questions answered.  Treatment options, risk, benefits, and imponderables discussed.  E consent obtained for future generator change.  Once device reaches 3 months longevity, then we will schedule.  Generator change and testing.  Hypertension with LVH. Benign, chronic, controlled. Stable. Reviewed ECG. Reviewed medications. Continue medications.  Refills discussed.  Osteoarthritis limits activity. Stable. Chronic.  Overweight      AHA recommendations for exercise, diet, and behavioral modification reviewed with pt.     The patient and I discussed the mechanism of arrhythmia, ECG, device check, field alert device, VT, increased dose of metoprolol, shared decision making, preoperative cardiac evaluation, E consent,, indications for and types of medications, discussion if and what medication refills needed, treatment options, risks, benefits, and imponderables. American Heart Association lifestyle changes and behavioral modification discussed. All questions answered in detail. Counseling over 50% visit regarding above. Patient appreciative of

## 2024-05-09 ENCOUNTER — SPECIALTY PHARMACY (OUTPATIENT)
Dept: PHARMACY | Facility: CLINIC | Age: 89
End: 2024-05-09

## 2024-05-09 PROCEDURE — RXMED WILLOW AMBULATORY MEDICATION CHARGE

## 2024-05-10 ENCOUNTER — PHARMACY VISIT (OUTPATIENT)
Dept: PHARMACY | Facility: CLINIC | Age: 89
End: 2024-05-10
Payer: COMMERCIAL

## 2024-06-05 ENCOUNTER — HOSPITAL ENCOUNTER (OUTPATIENT)
Dept: CARDIOLOGY | Facility: HOSPITAL | Age: 89
Discharge: HOME | End: 2024-06-05
Payer: MEDICARE

## 2024-06-05 DIAGNOSIS — Z95.810 AICD (AUTOMATIC CARDIOVERTER/DEFIBRILLATOR) PRESENT: ICD-10-CM

## 2024-06-05 DIAGNOSIS — I47.29 VENTRICULAR TACHYCARDIA (PAROXYSMAL) (MULTI): ICD-10-CM

## 2024-06-19 ENCOUNTER — TELEPHONE (OUTPATIENT)
Dept: CARDIOLOGY | Facility: HOSPITAL | Age: 89
End: 2024-06-19
Payer: COMMERCIAL

## 2024-06-19 DIAGNOSIS — I50.20 HFREF (HEART FAILURE WITH REDUCED EJECTION FRACTION) (MULTI): ICD-10-CM

## 2024-06-19 RX ORDER — SPIRONOLACTONE 25 MG/1
25 TABLET ORAL DAILY
Qty: 90 TABLET | Refills: 0 | Status: SHIPPED | OUTPATIENT
Start: 2024-06-19 | End: 2024-09-17

## 2024-07-05 ENCOUNTER — TELEPHONE (OUTPATIENT)
Dept: CARDIOLOGY | Facility: HOSPITAL | Age: 89
End: 2024-07-05
Payer: COMMERCIAL

## 2024-07-08 ENCOUNTER — SPECIALTY PHARMACY (OUTPATIENT)
Dept: PHARMACY | Facility: CLINIC | Age: 89
End: 2024-07-08

## 2024-07-08 PROCEDURE — RXMED WILLOW AMBULATORY MEDICATION CHARGE

## 2024-07-09 ENCOUNTER — HOSPITAL ENCOUNTER (OUTPATIENT)
Dept: CARDIOLOGY | Facility: HOSPITAL | Age: 89
Discharge: HOME | End: 2024-07-09
Payer: MEDICARE

## 2024-07-09 DIAGNOSIS — I47.29 VENTRICULAR TACHYCARDIA (PAROXYSMAL) (MULTI): ICD-10-CM

## 2024-07-09 DIAGNOSIS — Z95.810 AICD (AUTOMATIC CARDIOVERTER/DEFIBRILLATOR) PRESENT: ICD-10-CM

## 2024-07-10 ENCOUNTER — PHARMACY VISIT (OUTPATIENT)
Dept: PHARMACY | Facility: CLINIC | Age: 89
End: 2024-07-10
Payer: COMMERCIAL

## 2024-07-16 ENCOUNTER — APPOINTMENT (OUTPATIENT)
Dept: CARDIOLOGY | Facility: CLINIC | Age: 89
End: 2024-07-16
Payer: COMMERCIAL

## 2024-07-16 VITALS
OXYGEN SATURATION: 92 % | HEART RATE: 88 BPM | WEIGHT: 155 LBS | TEMPERATURE: 98.1 F | HEIGHT: 67 IN | BODY MASS INDEX: 24.33 KG/M2 | DIASTOLIC BLOOD PRESSURE: 57 MMHG | SYSTOLIC BLOOD PRESSURE: 95 MMHG

## 2024-07-16 DIAGNOSIS — R42 DIZZINESS: Primary | ICD-10-CM

## 2024-07-16 DIAGNOSIS — I50.20 HFREF (HEART FAILURE WITH REDUCED EJECTION FRACTION) (MULTI): ICD-10-CM

## 2024-07-16 PROCEDURE — 99215 OFFICE O/P EST HI 40 MIN: CPT | Performed by: INTERNAL MEDICINE

## 2024-07-16 PROCEDURE — 3074F SYST BP LT 130 MM HG: CPT | Performed by: INTERNAL MEDICINE

## 2024-07-16 PROCEDURE — 1036F TOBACCO NON-USER: CPT | Performed by: INTERNAL MEDICINE

## 2024-07-16 PROCEDURE — 3078F DIAST BP <80 MM HG: CPT | Performed by: INTERNAL MEDICINE

## 2024-07-16 PROCEDURE — 1159F MED LIST DOCD IN RCRD: CPT | Performed by: INTERNAL MEDICINE

## 2024-07-16 RX ORDER — METOPROLOL SUCCINATE 50 MG/1
50 TABLET, EXTENDED RELEASE ORAL DAILY
Qty: 90 TABLET | Refills: 3 | Status: SHIPPED | OUTPATIENT
Start: 2024-07-16 | End: 2025-07-16

## 2024-07-16 RX ORDER — SPIRONOLACTONE 25 MG/1
25 TABLET ORAL DAILY
Qty: 90 TABLET | Refills: 3 | Status: SHIPPED | OUTPATIENT
Start: 2024-07-16 | End: 2025-07-16

## 2024-07-16 RX ORDER — CARVEDILOL 12.5 MG/1
12.5 TABLET ORAL
Qty: 180 TABLET | Refills: 3 | Status: CANCELLED | OUTPATIENT
Start: 2024-07-16 | End: 2025-07-16

## 2024-07-16 NOTE — PROGRESS NOTES
"    Heart Failure Cardiology    Mario Anglin is a 88 y.o. male from [facility], retired  (retired at age of 84). Lives alone, wife passed away 2019. Lives in a condo. Here with his adult daughter Rosales.     His primary concern is that when he wakes up in the morning and gets out of bed he gets dizzy, \"my legs don't do what I want them to do,\" and sometimes he falls. He also has headaches when getting up from sleep.    He denies dyspnea on exertion, fatigue, or excessive swelling. He has taken furosemide since early June 2024.    Exam: /58 (BP Location: Right arm, Patient Position: Sitting, BP Cuff Size: Adult)   Pulse 88   Temp 36.7 °C (98.1 °F) (Temporal)   Ht 1.702 m (5' 7\")   Wt 70.3 kg (155 lb)   SpO2 92%   BMI 24.28 kg/m²   Well appearing  CTA  RRR  No LE edema    BP 88/48 sitting, and 95/57 standing    Current Outpatient Medications   Medication Instructions    aspirin 81 mg EC tablet 1 tablet, oral, Daily    atorvastatin (LIPITOR) 10 mg, oral, Nightly    carvedilol (COREG) 18.75 mg, oral, 2 times daily (morning and late afternoon)    celecoxib (CeleBREX) 200 mg capsule 1 capsule, oral, Daily    cholecalciferol (VITAMIN D-3) 50 mcg, oral, Daily    dapagliflozin propanediol (Farxiga) 10 mg TAKE ONE (1) TABLET BY MOUTH ONCE DAILY.    furosemide (LASIX) 20 mg, oral, As needed    magnesium oxide (MAG-OX) 400 mg, oral, Daily    omeprazole (PRILOSEC) 20 mg, oral, Daily before breakfast    potassium chloride ER (Micro-K) 10 mEq ER capsule 20 mEq, oral, Daily PRN, Take one tablet daily with Furosemide<BR>    sacubitriL-valsartan (Entresto)  mg tablet TAKE ONE (1) TABLET BY MOUTH TWICE DAILY.    spironolactone (ALDACTONE) 25 mg, oral, Daily, Must be seen by Dr. Chen for further refills. Call [phone]    tamsulosin (FLOMAX) 0.4 mg, oral, Daily    vitamin E mixed 400 unit capsule 1 capsule, oral, Daily     Past medical history (non-cardiac):  -- Hypertension  -- History of " diverticulitis    Cardiovascular history:  -- Non-ischemic cardiomypathy (prior stress tests negative for ischemia); cause unclear  -- Stage C HFrEF, NYHA class II; LVEF 20-25%, +aortic regurgitation (mild to moderaet AI), +MR  -- He was previously offered MV clip with Dr. Wooten, but he declined  -- Bradycardia 2015 with subsequent implantation of PPM/ICD    Assessment: He has quite low BP's at home, and here in clinic his resting BP is low (SBP 88mmHg). There is no orthostasis. Contributors including (1) gradual reduction in weight of 30 lbs since 2021, (2) aging, (3) tamsulosin use.    Plan:  -- Due to low BP's and dizziness I recommend: switch from carvedilol to metoprolol succinate 50mg every day, reduce dose of Entresto from 97/103mg bid to 49/51mg bid.   -- I explained that Flomax is well known to cause low BP and dizziness, and he will try stopping it to see what happens    Stephane Chen MD, MPH  [facility] Failure [facility] Trans[facility] ([facility])  [facility]  [place], [place]

## 2024-07-16 NOTE — PATIENT INSTRUCTIONS
To reach Dr. Chen's office please call [phone] ([facility]). Fax [phone]. Call [phone] to schedule an appointment. You may also contact the HF RNs at [email or web address]     Thank you for coming to your appointment today. If you have any questions or need cardiac medication refills, please call the Heart Failure Office at [phone] option [phone].      Try to stop taking Tamsulosin (Flomax)  STOP taking Carvedilol 12.5 mg twice daily   Start taking Metoprolol Succinate 50 mg once daily   Reduce dose of Entresto from  to 49-51 mg twice daily   Do not take Potassium when you take the Lasix   Please schedule a follow up visit if needed

## 2024-07-17 PROCEDURE — RXMED WILLOW AMBULATORY MEDICATION CHARGE

## 2024-07-23 ENCOUNTER — SPECIALTY PHARMACY (OUTPATIENT)
Dept: PHARMACY | Facility: CLINIC | Age: 89
End: 2024-07-23

## 2024-07-24 ENCOUNTER — PHARMACY VISIT (OUTPATIENT)
Dept: PHARMACY | Facility: CLINIC | Age: 89
End: 2024-07-24
Payer: COMMERCIAL

## 2024-08-15 ENCOUNTER — HOSPITAL ENCOUNTER (OUTPATIENT)
Dept: CARDIOLOGY | Facility: HOSPITAL | Age: 89
Discharge: HOME | End: 2024-08-15
Payer: MEDICARE

## 2024-08-15 DIAGNOSIS — Z95.810 AICD (AUTOMATIC CARDIOVERTER/DEFIBRILLATOR) PRESENT: ICD-10-CM

## 2024-08-15 DIAGNOSIS — I47.29 VENTRICULAR TACHYCARDIA (PAROXYSMAL) (MULTI): ICD-10-CM

## 2024-08-15 PROCEDURE — 93296 REM INTERROG EVL PM/IDS: CPT

## 2024-08-26 ENCOUNTER — SPECIALTY PHARMACY (OUTPATIENT)
Dept: PHARMACY | Facility: CLINIC | Age: 89
End: 2024-08-26

## 2024-08-26 PROCEDURE — RXMED WILLOW AMBULATORY MEDICATION CHARGE

## 2024-08-27 ENCOUNTER — PHARMACY VISIT (OUTPATIENT)
Dept: PHARMACY | Facility: CLINIC | Age: 89
End: 2024-08-27
Payer: COMMERCIAL

## 2024-09-10 ENCOUNTER — APPOINTMENT (OUTPATIENT)
Dept: CARDIOLOGY | Facility: CLINIC | Age: 89
End: 2024-09-10
Payer: MEDICARE

## 2024-09-10 ENCOUNTER — HOSPITAL ENCOUNTER (OUTPATIENT)
Dept: CARDIOLOGY | Facility: HOSPITAL | Age: 89
Discharge: HOME | End: 2024-09-10
Payer: MEDICARE

## 2024-09-10 VITALS
HEIGHT: 67 IN | SYSTOLIC BLOOD PRESSURE: 108 MMHG | BODY MASS INDEX: 25.43 KG/M2 | WEIGHT: 162 LBS | DIASTOLIC BLOOD PRESSURE: 58 MMHG | HEART RATE: 76 BPM

## 2024-09-10 DIAGNOSIS — R55 NEAR SYNCOPE: ICD-10-CM

## 2024-09-10 DIAGNOSIS — Z95.810 AICD (AUTOMATIC CARDIOVERTER/DEFIBRILLATOR) PRESENT: ICD-10-CM

## 2024-09-10 DIAGNOSIS — E78.2 MIXED HYPERLIPIDEMIA: ICD-10-CM

## 2024-09-10 DIAGNOSIS — Z45.02 IMPLANTABLE CARDIOVERTER-DEFIBRILLATOR (ICD) AT END OF BATTERY LIFE: ICD-10-CM

## 2024-09-10 DIAGNOSIS — Z01.810 PRE-OPERATIVE CARDIOVASCULAR EXAMINATION, SUPRAVENTRICULAR ARRHYTHMIA: ICD-10-CM

## 2024-09-10 DIAGNOSIS — I34.0 NONRHEUMATIC MITRAL VALVE REGURGITATION: ICD-10-CM

## 2024-09-10 DIAGNOSIS — I49.9 PRE-OPERATIVE CARDIOVASCULAR EXAMINATION, SUPRAVENTRICULAR ARRHYTHMIA: ICD-10-CM

## 2024-09-10 DIAGNOSIS — I07.1 TRICUSPID VALVE INSUFFICIENCY, UNSPECIFIED ETIOLOGY: ICD-10-CM

## 2024-09-10 DIAGNOSIS — I50.22 CHRONIC SYSTOLIC HEART FAILURE, ACC/AHA STAGE C (MULTI): ICD-10-CM

## 2024-09-10 DIAGNOSIS — Z71.89 ENCOUNTER TO DISCUSS TREATMENT OPTIONS: ICD-10-CM

## 2024-09-10 DIAGNOSIS — Z71.89 ENCOUNTER FOR MEDICATION REVIEW AND COUNSELING: ICD-10-CM

## 2024-09-10 DIAGNOSIS — I47.20 VENTRICULAR TACHYCARDIA (MULTI): Primary | ICD-10-CM

## 2024-09-10 DIAGNOSIS — I49.3 PVC (PREMATURE VENTRICULAR CONTRACTION): ICD-10-CM

## 2024-09-10 DIAGNOSIS — I42.8 NONISCHEMIC CARDIOMYOPATHY (MULTI): ICD-10-CM

## 2024-09-10 DIAGNOSIS — Z78.9 NEVER SMOKED CIGARETTES: ICD-10-CM

## 2024-09-10 PROCEDURE — 1036F TOBACCO NON-USER: CPT | Performed by: INTERNAL MEDICINE

## 2024-09-10 PROCEDURE — 93000 ELECTROCARDIOGRAM COMPLETE: CPT | Mod: DISTINCT PROCEDURAL SERVICE | Performed by: INTERNAL MEDICINE

## 2024-09-10 PROCEDURE — 93283 PRGRMG EVAL IMPLANTABLE DFB: CPT | Performed by: INTERNAL MEDICINE

## 2024-09-10 PROCEDURE — 1159F MED LIST DOCD IN RCRD: CPT | Performed by: INTERNAL MEDICINE

## 2024-09-10 PROCEDURE — 3078F DIAST BP <80 MM HG: CPT | Performed by: INTERNAL MEDICINE

## 2024-09-10 PROCEDURE — 99215 OFFICE O/P EST HI 40 MIN: CPT | Performed by: INTERNAL MEDICINE

## 2024-09-10 PROCEDURE — 3074F SYST BP LT 130 MM HG: CPT | Performed by: INTERNAL MEDICINE

## 2024-09-10 PROCEDURE — 93283 PRGRMG EVAL IMPLANTABLE DFB: CPT

## 2024-09-10 RX ORDER — CHLORHEXIDINE GLUCONATE 40 MG/ML
SOLUTION TOPICAL ONCE
OUTPATIENT
Start: 2024-09-10 | End: 2024-09-10

## 2024-09-10 RX ORDER — MUPIROCIN 20 MG/G
1 OINTMENT TOPICAL ONCE
OUTPATIENT
Start: 2024-09-10 | End: 2024-09-10

## 2024-09-10 RX ORDER — SODIUM CHLORIDE 9 MG/ML
100 INJECTION, SOLUTION INTRAVENOUS CONTINUOUS
OUTPATIENT
Start: 2024-09-10

## 2024-09-10 ASSESSMENT — ENCOUNTER SYMPTOMS
PALPITATIONS: 0
DYSPNEA ON EXERTION: 0

## 2024-09-10 NOTE — PROGRESS NOTES
"Chief Complaint:   Follow-up (4 month with device check)     History Of Present Illness:    Mario Anglin is a 89 y.o. male presenting with followup.    Patient denies any arrhythmia symptoms of palpitation, lightheadedness, near syncope, or syncope.         Last Recorded Vitals:  Vitals:    09/10/24 1317   BP: 108/58   BP Location: Right arm   Patient Position: Sitting   Pulse: 76   Weight: 73.5 kg (162 lb)   Height: 1.702 m (5' 7\")       Past Medical History:  See List    Past Surgical History:  See List      Social History:  He reports that he has never smoked. He has never used smokeless tobacco. He reports that he does not drink alcohol and does not use drugs.    Family History:  Family History   Problem Relation Name Age of Onset    Cancer Father Vikas anglin         Allergies:  Patient has no known allergies.    Outpatient Medications:  Current Outpatient Medications   Medication Instructions    aspirin 81 mg EC tablet 1 tablet, oral, Daily    atorvastatin (LIPITOR) 10 mg, oral, Nightly    carvedilol (COREG) 18.75 mg, oral, 2 times daily (morning and late afternoon)    celecoxib (CeleBREX) 200 mg capsule 1 capsule, oral, Daily    cholecalciferol (VITAMIN D-3) 50 mcg, oral, Daily    dapagliflozin propanediol (Farxiga) 10 mg TAKE ONE (1) TABLET BY MOUTH ONCE DAILY.    furosemide (LASIX) 20 mg, oral, As needed    magnesium oxide (MAG-OX) 400 mg, oral, Daily    metoprolol succinate XL (TOPROL-XL) 50 mg, oral, Daily, Do not crush or chew.    omeprazole (PRILOSEC) 20 mg, oral, Daily before breakfast    potassium chloride ER (Micro-K) 10 mEq ER capsule 20 mEq, oral, Daily PRN, Take one tablet daily with Furosemide<BR>    sacubitriL-valsartan (Entresto) 49-51 mg tablet 1 tablet, oral, 2 times daily    spironolactone (ALDACTONE) 25 mg, oral, Daily    vitamin E mixed 400 unit capsule 1 capsule, oral, Daily   Review of Systems   Cardiovascular:  Negative for chest pain, dyspnea on exertion and palpitations.   All " "other systems reviewed and are negative.        Physical Exam:  Constitutional:       General: Awake.      Appearance: Normal and healthy appearance. Well-developed and not in distress.   Neck:      Vascular: No JVR. JVD normal.   Pulmonary:      Effort: Pulmonary effort is normal.      Breath sounds: Normal breath sounds. No wheezing. No rhonchi. No rales.   Chest:      Chest wall: Not tender to palpatation.   Cardiovascular:      PMI at left midclavicular line. Normal rate. Regular rhythm. Normal S1. Normal S2.       Murmurs: There is no murmur.      No gallop.  No click. No rub.   Pulses:     Intact distal pulses.   Edema:     Peripheral edema absent.   Abdominal:      Tenderness: There is no abdominal tenderness.   Musculoskeletal: Normal range of motion.         General: No tenderness. Skin:     General: Skin is warm and dry.   Neurological:      General: No focal deficit present.      Mental Status: Alert and oriented to person, place and time.            Last Labs:  CBC -  No results found for: \"WBC\", \"HGB\", \"HCT\", \"MCV\", \"PLT\"    CMP -  Lab Results   Component Value Date    CALCIUM 8.8 07/02/2021       LIPID PANEL -   No results found for: \"CHOL\", \"TRIG\", \"HDL\", \"CHHDL\", \"LDLF\", \"VLDL\", \"NHDL\"    RENAL FUNCTION PANEL -   Lab Results   Component Value Date    GLUCOSE 78 07/02/2021     07/02/2021    K 4.4 07/02/2021     07/02/2021    CO2 29 07/02/2021    ANIONGAP 11 07/02/2021    BUN 25 (H) 07/02/2021    CREATININE 1.35 (H) 07/02/2021    CALCIUM 8.8 07/02/2021        Lab Results   Component Value Date     (H) 05/27/2021       Last Cardiology Tests:  ECG:  ECG 12 lead (Clinic Performed) 04/30/2024    Today. Approp pacing. RAD. Qtc 450 ms. Prior anterior infarct (possible). Nonspecific St and T abnormality. First degree AV block.    Device check today. Medtgronic [id number] estim longevity device 3 months. 83% A pacing. 0.3% V pacing. 5 NSVT.  Lab review: I have personally reviewed the laboratory " result(s) see above    Assessment/Plan   Diagnoses and all orders for this visit:  Ventricular tachycardia (Multi)  Implantable cardioverter-defibrillator (ICD) at end of battery life  AICD (automatic cardioverter/defibrillator) present  PVC (premature ventricular contraction)  Nonischemic cardiomyopathy (Multi)  Nonrheumatic mitral valve regurgitation  Tricuspid valve insufficiency, unspecified etiology  Mixed hyperlipidemia  Chronic systolic heart failure, ACC/AHA stage C (Multi)  Near syncope  Never smoked cigarettes  BMI 25.0-25.9,adult  Encounter for medication review and counseling  Encounter to discuss treatment options        Joselin Hester RN    Nonischemic cardiomyopathy. Valvular heart disease NYHA II C heart failure. Narrow QRS. Chronic. Stable. LVEF 29% by nuclear stress testing 2020. LVEF 20 to 25% and 20% by echocardiogram 2022.   Valvular heart disease with moderate to severe MR, moderate TR, trace to mild AI, and LV dilation. Follows with cardiology.  Nonsustained ventricular tachycardia.  Recurrent episodes. Tolerating metoprolol.  Orthostatic dizziness.  Improved symptomswith staggering doses of medications that are vasoactive.  Primary prevention ICD and abnormal AV adali function, abnormal SA adali function by EP testing 2015. Status post ICD 2015.   Medtronic DD BB 1 D4 ICD. Chronic. On field alert.  Device at DAVID. Shared decision making performed.  [place] decision tool.  All questions answered.  Treatment options, risk, benefits, and imponderables discussed.  E consent obtained for future generator change.    Hypertension with LVH. Benign, chronic, controlled. Stable. Reviewed ECG. Reviewed medications. Continue medications.  Discussed refills.  Osteoarthritis limits activity. Stable. Chronic.  Overweight  AHA recommendations for exercise, diet, and behavioral modification reviewed with pt.     Counseling over 50% visit performed. The patient and I discussed the mechanism of arrhythmia,  ECG, device check, field alert device, DAVID device, generator change, preop cardiac evaluation, indications for and types of medications, discussion if and what medication refills needed, treatment options, risks, benefits, and imponderables. American Heart Association lifestyle changes and behavioral modification discussed. All questions answered in detail. Patient appreciative of care.

## 2024-09-10 NOTE — PATIENT INSTRUCTIONS
Continue same medications/treatment.  Patient educated on proper medication use.  Patient educated on risk factor modification.  Please bring any lab results from other providers/physicians to your next appointment.    Please bring all medicines, vitamins, and herbal supplements with you when you come to the office.    Prescriptions will not be filled unless you are compliant with your follow up appointments or have a follow up appointment scheduled as per instruction of your physician. Refills should be requested at the time of your visit.    SCHEDULE dual chamber ICD generator change out. Obtain labs 1 week prior to procedure. Orders are in the system.   Follow up 7 days after procedure for wound check.   HOLD Aspirin and Vitamin E for 7 days prior to procedure.   HOLD Farxiga for 4 days prior to procedure.     IHARVEY RN, AM SCRIBING FOR AND IN THE PRESENCE OF DR. VANIA VALDEZ MD, FACC, FACP, RS

## 2024-09-11 PROBLEM — Z45.02 IMPLANTABLE CARDIOVERTER-DEFIBRILLATOR (ICD) AT END OF BATTERY LIFE: Status: ACTIVE | Noted: 2024-09-10

## 2024-10-09 LAB
NON-UH HIE ANION GAP: 9.7 (ref 6–15)
NON-UH HIE BASOPHILS # (AUTO): 0.2 10*3/UL (ref 0–0.2)
NON-UH HIE BASOPHILS % (AUTO): 2 %
NON-UH HIE BLOOD UREA NITROGEN: 24 MG/DL (ref 7–25)
NON-UH HIE CALCIUM: 9.1 MG/DL (ref 8.6–10.3)
NON-UH HIE CARBON DIOXIDE: 28.4 MMOL/L (ref 21–31)
NON-UH HIE CHLORIDE: 107 MMOL/L (ref 98–107)
NON-UH HIE CREATININE: 1.4 MG/DL (ref 0.7–1.3)
NON-UH HIE EOSINOPHILS # (AUTO): 0.1 10*3/UL (ref 0–0.45)
NON-UH HIE EOSINOPHILS % (AUTO): 1 %
NON-UH HIE ESTIMATED GFR: 48.65
NON-UH HIE GLUCOSE: 109 MG/DL (ref 70–100)
NON-UH HIE HEMATOCRIT: 35.7 % (ref 38.8–50)
NON-UH HIE HEMOGLOBIN: 12 G/DL (ref 13–17)
NON-UH HIE INR: 1.2
NON-UH HIE LYMPHOCYTES # (AUTO): 1 10*3/UL (ref 1–4.8)
NON-UH HIE LYMPHOCYTES % (AUTO): 12.8 %
NON-UH HIE MEAN CORPUSCULAR HEMOGLOBIN: 30.6 PG (ref 27.5–35.2)
NON-UH HIE MEAN CORPUSCULAR HGB CONC: 33.5 G/DL (ref 32.5–35.6)
NON-UH HIE MEAN CORPUSCULAR VOLUME: 91.2 FL (ref 83.5–101)
NON-UH HIE MEAN PLATELET VOLUME: 9 FL (ref 6.6–10.1)
NON-UH HIE MONOCYTES # (AUTO): 0.6 10*3/UL (ref 0–0.8)
NON-UH HIE MONOCYTES % (AUTO): 8.3 %
NON-UH HIE NEUTROPHILS # (AUTO): 5.7 10*3/UL (ref 1.8–7.7)
NON-UH HIE NEUTROPHILS % (AUTO): 75.9 %
NON-UH HIE NRBC%: 0.4 /100{WBC} (ref 0–0.5)
NON-UH HIE PARTIAL THROMBOPLASTIN TIME: 34.5 S (ref 25.1–36.5)
NON-UH HIE PLATELET COUNT: 721 10*3/UL (ref 150–450)
NON-UH HIE POTASSIUM: 5.1 MMOL/L (ref 3.5–5.1)
NON-UH HIE PROTHROMBIN TIME: 13.3 S (ref 9–12.9)
NON-UH HIE RED BLOOD COUNT: 3.92 (ref 3.9–5.6)
NON-UH HIE RED CELL DISTRIBUTION WIDTH: 15.2 % (ref 12–14.8)
NON-UH HIE SODIUM: 140 MMOL/L (ref 136–145)
NON-UH HIE UNCORRECTED WBC: 7.6 10*3/UL (ref 4.1–10.5)
NON-UH HIE WHITE BLOOD COUNT: 7.6 10*3/UL (ref 4.1–10.5)

## 2024-10-15 ENCOUNTER — ANESTHESIA EVENT (OUTPATIENT)
Dept: CARDIOLOGY | Facility: HOSPITAL | Age: 89
End: 2024-10-15
Payer: MEDICARE

## 2024-10-15 SDOH — HEALTH STABILITY: MENTAL HEALTH: CURRENT SMOKER: 0

## 2024-10-15 NOTE — ANESTHESIA PREPROCEDURE EVALUATION
Mario Anglin is a 89 y.o. male here for:    ICD - Dual Generator Change Out  With Maricarmen Andre MD  Pre-Op Diagnosis Codes:      * Fast heart beat [I47.20]     * Implantable cardioverter-defibrillator (ICD) at end of battery life [Z45.02]    Relevant Problems   Cardiac   (+) AICD (automatic cardioverter/defibrillator) present   (+) Aortic valve regurgitation, nonrheumatic   (+) Essential hypertension, benign   (+) Hyperlipidemia   (+) Mitral regurgitation   (+) PVC (premature ventricular contraction)   (+) Tricuspid regurgitation   (+) Ventricular tachycardia (Multi)      Pulmonary   (+) Pulmonary hypertension (Multi)      GI   (+) GI bleed   (+) Gastroesophageal reflux disease without esophagitis      /Renal   (+) Benign prostatic hyperplasia without lower urinary tract symptoms      Hematology   (+) Anemia, blood loss      HEENT   (+) Anatomical narrow angle borderline glaucoma       Lab Results   Component Value Date     07/02/2021    K 4.4 07/02/2021     07/02/2021    CREATININE 1.35 (H) 07/02/2021    BUN 25 (H) 07/02/2021       Social History     Tobacco Use   Smoking Status Never   Smokeless Tobacco Never       No Known Allergies    Current Outpatient Medications   Medication Instructions   • aspirin 81 mg EC tablet 1 tablet, oral, Daily   • atorvastatin (LIPITOR) 10 mg, oral, Nightly   • carvedilol (COREG) 18.75 mg, oral, 2 times daily (morning and late afternoon)   • celecoxib (CeleBREX) 200 mg capsule 1 capsule, oral, Daily   • cholecalciferol (VITAMIN D-3) 50 mcg, oral, Daily   • dapagliflozin propanediol (Farxiga) 10 mg TAKE ONE (1) TABLET BY MOUTH ONCE DAILY.   • furosemide (LASIX) 20 mg, oral, As needed   • magnesium oxide (MAG-OX) 400 mg, oral, Daily   • metoprolol succinate XL (TOPROL-XL) 50 mg, oral, Daily, Do not crush or chew.   • omeprazole (PRILOSEC) 20 mg, oral, Daily before breakfast   • potassium chloride ER (Micro-K) 10 mEq ER capsule 20 mEq, oral, Daily PRN, Take one tablet  daily with Furosemide<BR>   • sacubitriL-valsartan (Entresto) 49-51 mg tablet 1 tablet, oral, 2 times daily   • spironolactone (ALDACTONE) 25 mg, oral, Daily   • vitamin E mixed 400 unit capsule 1 capsule, oral, Daily       Past Surgical History:   Procedure Laterality Date   • INSERT / REPLACE / REMOVE PACEMAKER  7/23/2015   • OTHER SURGICAL HISTORY  11/30/2021    Cardioverter defibrillator insertion   • OTHER SURGICAL HISTORY  11/30/2021    Knee surgery   • OTHER SURGICAL HISTORY  11/30/2021    Hernia repair   • OTHER SURGICAL HISTORY  11/30/2021    Complete colonoscopy   • OTHER SURGICAL HISTORY  08/09/2022    Cardiac catheterization       Family History   Problem Relation Name Age of Onset   • Cancer Father Vikas virgen        NPO Details:  No data recorded    Physical Exam    Airway  Mallampati: II  TM distance: >3 FB  Neck ROM: full     Cardiovascular    Dental - normal exam     Pulmonary    Abdominal        Anesthesia Plan    History of general anesthesia?: yes  History of complications of general anesthesia?: no    ASA 3     MAC     The patient is not a current smoker.    intravenous induction   Anesthetic plan and risks discussed with patient.    Plan discussed with CRNA.

## 2024-10-16 ENCOUNTER — ANESTHESIA (OUTPATIENT)
Dept: CARDIOLOGY | Facility: HOSPITAL | Age: 89
End: 2024-10-16
Payer: MEDICARE

## 2024-10-16 ENCOUNTER — HOSPITAL ENCOUNTER (OUTPATIENT)
Facility: HOSPITAL | Age: 89
Setting detail: OUTPATIENT SURGERY
Discharge: HOME | End: 2024-10-16
Attending: INTERNAL MEDICINE | Admitting: INTERNAL MEDICINE
Payer: MEDICARE

## 2024-10-16 ENCOUNTER — APPOINTMENT (OUTPATIENT)
Dept: CARDIOLOGY | Facility: HOSPITAL | Age: 89
End: 2024-10-16
Payer: MEDICARE

## 2024-10-16 VITALS
OXYGEN SATURATION: 98 % | BODY MASS INDEX: 25.19 KG/M2 | HEART RATE: 56 BPM | SYSTOLIC BLOOD PRESSURE: 148 MMHG | WEIGHT: 160.5 LBS | HEIGHT: 67 IN | RESPIRATION RATE: 18 BRPM | DIASTOLIC BLOOD PRESSURE: 68 MMHG | TEMPERATURE: 97.5 F

## 2024-10-16 DIAGNOSIS — Z95.810 AICD (AUTOMATIC CARDIOVERTER/DEFIBRILLATOR) PRESENT: ICD-10-CM

## 2024-10-16 DIAGNOSIS — Z45.02 IMPLANTABLE CARDIOVERTER-DEFIBRILLATOR (ICD) AT END OF BATTERY LIFE: ICD-10-CM

## 2024-10-16 DIAGNOSIS — I47.20 VENTRICULAR TACHYCARDIA (MULTI): Primary | ICD-10-CM

## 2024-10-16 LAB
ATRIAL RATE: 82 BPM
P AXIS: -19 DEGREES
P OFFSET: 175 MS
P ONSET: 127 MS
PR INTERVAL: 238 MS
Q ONSET: 216 MS
QRS COUNT: 13 BEATS
QRS DURATION: 120 MS
QT INTERVAL: 378 MS
QTC CALCULATION(BAZETT): 441 MS
QTC FREDERICIA: 419 MS
R AXIS: 26 DEGREES
T AXIS: 164 DEGREES
T OFFSET: 405 MS
VENTRICULAR RATE: 82 BPM

## 2024-10-16 PROCEDURE — 7100000010 HC PHASE TWO TIME - EACH INCREMENTAL 1 MINUTE: Performed by: INTERNAL MEDICINE

## 2024-10-16 PROCEDURE — 99153 MOD SED SAME PHYS/QHP EA: CPT | Performed by: INTERNAL MEDICINE

## 2024-10-16 PROCEDURE — 33263 RMVL & RPLCMT DFB GEN 2 LEAD: CPT | Performed by: INTERNAL MEDICINE

## 2024-10-16 PROCEDURE — 33223 RELOCATE POCKET FOR DEFIB: CPT | Mod: 59 | Performed by: INTERNAL MEDICINE

## 2024-10-16 PROCEDURE — 99152 MOD SED SAME PHYS/QHP 5/>YRS: CPT | Performed by: INTERNAL MEDICINE

## 2024-10-16 PROCEDURE — 2500000004 HC RX 250 GENERAL PHARMACY W/ HCPCS (ALT 636 FOR OP/ED): Performed by: INTERNAL MEDICINE

## 2024-10-16 PROCEDURE — 2500000001 HC RX 250 WO HCPCS SELF ADMINISTERED DRUGS (ALT 637 FOR MEDICARE OP): Performed by: INTERNAL MEDICINE

## 2024-10-16 PROCEDURE — 7100000001 HC RECOVERY ROOM TIME - INITIAL BASE CHARGE: Performed by: INTERNAL MEDICINE

## 2024-10-16 PROCEDURE — 2720000007 HC OR 272 NO HCPCS: Performed by: INTERNAL MEDICINE

## 2024-10-16 PROCEDURE — 2500000004 HC RX 250 GENERAL PHARMACY W/ HCPCS (ALT 636 FOR OP/ED): Performed by: NURSE ANESTHETIST, CERTIFIED REGISTERED

## 2024-10-16 PROCEDURE — 93005 ELECTROCARDIOGRAM TRACING: CPT

## 2024-10-16 PROCEDURE — 3700000001 HC GENERAL ANESTHESIA TIME - INITIAL BASE CHARGE: Performed by: INTERNAL MEDICINE

## 2024-10-16 PROCEDURE — C1721 AICD, DUAL CHAMBER: HCPCS | Performed by: INTERNAL MEDICINE

## 2024-10-16 PROCEDURE — 2780000003 HC OR 278 NO HCPCS: Performed by: INTERNAL MEDICINE

## 2024-10-16 PROCEDURE — 7100000009 HC PHASE TWO TIME - INITIAL BASE CHARGE: Performed by: INTERNAL MEDICINE

## 2024-10-16 PROCEDURE — C1781 MESH (IMPLANTABLE): HCPCS | Performed by: INTERNAL MEDICINE

## 2024-10-16 PROCEDURE — 93287 PERI-PX DEVICE EVAL & PRGR: CPT | Performed by: INTERNAL MEDICINE

## 2024-10-16 PROCEDURE — 2750000001 HC OR 275 NO HCPCS: Performed by: INTERNAL MEDICINE

## 2024-10-16 PROCEDURE — 7100000002 HC RECOVERY ROOM TIME - EACH INCREMENTAL 1 MINUTE: Performed by: INTERNAL MEDICINE

## 2024-10-16 PROCEDURE — 3700000002 HC GENERAL ANESTHESIA TIME - EACH INCREMENTAL 1 MINUTE: Performed by: INTERNAL MEDICINE

## 2024-10-16 PROCEDURE — 93641 EP EVL 1/2CHMB PAC CVDFB TST: CPT | Performed by: INTERNAL MEDICINE

## 2024-10-16 DEVICE — ICD-DR DDMB1D4 EVERA MRI XT US IS1/DF4
Type: IMPLANTABLE DEVICE | Site: CHEST | Status: FUNCTIONAL
Brand: EVERA MRI™ XT DR SURESCAN®

## 2024-10-16 RX ORDER — MIDAZOLAM HYDROCHLORIDE 1 MG/ML
INJECTION, SOLUTION INTRAMUSCULAR; INTRAVENOUS AS NEEDED
Status: DISCONTINUED | OUTPATIENT
Start: 2024-10-16 | End: 2024-10-16 | Stop reason: HOSPADM

## 2024-10-16 RX ORDER — BUPIVACAINE HYDROCHLORIDE 2.5 MG/ML
INJECTION, SOLUTION EPIDURAL; INFILTRATION; INTRACAUDAL AS NEEDED
Status: DISCONTINUED | OUTPATIENT
Start: 2024-10-16 | End: 2024-10-16 | Stop reason: HOSPADM

## 2024-10-16 RX ORDER — ASPIRIN 81 MG/1
81 TABLET ORAL DAILY
Start: 2024-10-16

## 2024-10-16 RX ORDER — MUPIROCIN 20 MG/G
1 OINTMENT TOPICAL ONCE
Status: COMPLETED | OUTPATIENT
Start: 2024-10-16 | End: 2024-10-16

## 2024-10-16 RX ORDER — PROPOFOL 10 MG/ML
INJECTION, EMULSION INTRAVENOUS AS NEEDED
Status: DISCONTINUED | OUTPATIENT
Start: 2024-10-16 | End: 2024-10-16

## 2024-10-16 RX ORDER — LIDOCAINE HYDROCHLORIDE 10 MG/ML
INJECTION, SOLUTION EPIDURAL; INFILTRATION; INTRACAUDAL; PERINEURAL AS NEEDED
Status: DISCONTINUED | OUTPATIENT
Start: 2024-10-16 | End: 2024-10-16 | Stop reason: HOSPADM

## 2024-10-16 RX ORDER — CEFAZOLIN SODIUM 1 G/50ML
1 SOLUTION INTRAVENOUS ONCE
Status: COMPLETED | OUTPATIENT
Start: 2024-10-16 | End: 2024-10-16

## 2024-10-16 RX ORDER — FENTANYL CITRATE 50 UG/ML
INJECTION, SOLUTION INTRAMUSCULAR; INTRAVENOUS AS NEEDED
Status: DISCONTINUED | OUTPATIENT
Start: 2024-10-16 | End: 2024-10-16 | Stop reason: HOSPADM

## 2024-10-16 RX ORDER — ACETAMINOPHEN 325 MG/1
650 TABLET ORAL EVERY 4 HOURS PRN
Status: DISCONTINUED | OUTPATIENT
Start: 2024-10-16 | End: 2024-10-16 | Stop reason: HOSPADM

## 2024-10-16 RX ORDER — CHLORHEXIDINE GLUCONATE 40 MG/ML
SOLUTION TOPICAL ONCE
Status: COMPLETED | OUTPATIENT
Start: 2024-10-16 | End: 2024-10-16

## 2024-10-16 RX ORDER — ONDANSETRON HYDROCHLORIDE 2 MG/ML
4 INJECTION, SOLUTION INTRAVENOUS EVERY 8 HOURS PRN
Status: DISCONTINUED | OUTPATIENT
Start: 2024-10-16 | End: 2024-10-16 | Stop reason: HOSPADM

## 2024-10-16 RX ORDER — GLUCOSAMINE/CHONDR SU A SOD 750-600 MG
1 TABLET ORAL DAILY
Start: 2024-10-16

## 2024-10-16 ASSESSMENT — COLUMBIA-SUICIDE SEVERITY RATING SCALE - C-SSRS
2. HAVE YOU ACTUALLY HAD ANY THOUGHTS OF KILLING YOURSELF?: NO
1. IN THE PAST MONTH, HAVE YOU WISHED YOU WERE DEAD OR WISHED YOU COULD GO TO SLEEP AND NOT WAKE UP?: NO
6. HAVE YOU EVER DONE ANYTHING, STARTED TO DO ANYTHING, OR PREPARED TO DO ANYTHING TO END YOUR LIFE?: NO

## 2024-10-16 ASSESSMENT — ENCOUNTER SYMPTOMS
FEVER: 0
SHORTNESS OF BREATH: 0
PALPITATIONS: 0
FATIGUE: 0
CHILLS: 0

## 2024-10-16 ASSESSMENT — PAIN SCALES - GENERAL
PAINLEVEL_OUTOF10: 0 - NO PAIN
PAINLEVEL_OUTOF10: 0 - NO PAIN
PAIN_LEVEL: 0
PAINLEVEL_OUTOF10: 0 - NO PAIN
PAINLEVEL_OUTOF10: 0 - NO PAIN

## 2024-10-16 ASSESSMENT — PAIN - FUNCTIONAL ASSESSMENT: PAIN_FUNCTIONAL_ASSESSMENT: 0-10

## 2024-10-16 NOTE — Clinical Note
Burst induced V-tach terminated by ICD. 56 Ohms,  15 Joules,  3.3 Seconds. DFT Successful. Patient converted back into NSR

## 2024-10-16 NOTE — H&P
History Of Present Illness  Mario Anglin is a 89 y.o. male presenting with device at replacement indicator.    Patient denies any arrhythmia symptoms of palpitation, lightheadedness, near syncope, or syncope.       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    Personal history of other diseases of the circulatory system 06/11/2021    History of heart failure       Surgical History  Past Surgical History:   Procedure Laterality Date    INSERT / REPLACE / REMOVE PACEMAKER  7/23/2015    OTHER SURGICAL HISTORY  11/30/2021    Cardioverter defibrillator insertion    OTHER SURGICAL HISTORY  11/30/2021    Knee surgery    OTHER SURGICAL HISTORY  11/30/2021    Hernia repair    OTHER SURGICAL HISTORY  11/30/2021    Complete colonoscopy    OTHER SURGICAL HISTORY  08/09/2022    Cardiac catheterization        Social History  He reports that he has never smoked. He has never used smokeless tobacco. He reports that he does not drink alcohol and does not use drugs.    Family History  Family History   Problem Relation Name Age of Onset    Cancer Father Vikas anglin         Allergies  Patient has no known allergies.    Review of Systems   Constitutional:  Negative for chills, fatigue and fever.   Respiratory:  Negative for shortness of breath.    Cardiovascular:  Negative for chest pain and palpitations.   All other systems reviewed and are negative.       Physical Exam  Vitals reviewed.   HENT:      Head: Normocephalic.      Nose: Nose normal.      Mouth/Throat:      Mouth: Mucous membranes are dry.   Eyes:      Extraocular Movements: Extraocular movements intact.   Cardiovascular:      Rate and Rhythm: Normal rate and regular rhythm.      Heart sounds: Normal heart sounds.   Pulmonary:      Breath sounds: Normal breath sounds.   Musculoskeletal:      Cervical back: No rigidity.   Skin:      "General: Skin is dry.   Neurological:      Mental Status: He is alert and oriented to person, place, and time.   Psychiatric:         Behavior: Behavior normal.          Last Recorded Vitals  Blood pressure 148/68, pulse 56, temperature 36.4 °C (97.5 °F), temperature source Temporal, resp. rate 18, height 1.702 m (5' 7\"), weight 72.8 kg (160 lb 7.9 oz), SpO2 98%.    Relevant Results  No results found for: \"WBC\", \"HGB\", \"HCT\", \"MCV\", \"PLT\"   Lab Results   Component Value Date    GLUCOSE 78 07/02/2021    CALCIUM 8.8 07/02/2021     07/02/2021    K 4.4 07/02/2021    CO2 29 07/02/2021     07/02/2021    BUN 25 (H) 07/02/2021    CREATININE 1.35 (H) 07/02/2021      No results found for: \"INR\", \"PROTIME\"        Assessment/Plan   Assessment & Plan  Implantable cardioverter-defibrillator (ICD) at end of battery life    Ventricular tachycardia (Multi)      Nonischemic cardiomyopathy. Valvular heart disease NYHA II C heart failure. Narrow QRS.  LVEF 20% by echocardiogram 2022. Chronic systolic heart failure.  Nonsustained ventricular tachycardia.  Recurrent episodes. Tolerating metoprolol.  Orthostatic dizziness.  Improved symptomswith staggering doses of medications that are vasoactive.  Primary prevention ICD and abnormal AV adali function, abnormal SA adali function by EP testing 2015. Status post ICD 2015.  Device now at DAVID.  Shared decision making performed.  [place] decision tool.  All questions answered.  Treatment options, risk, benefits, and imponderables discussed.  E consent confirmed.     Counseling over 50% visit performed. The patient and I discussed the mechanism of arrhythmia, ECG, device check, field alert device, shared decision making, preop cardiac evaluation, [place] decision tool,  treatment options, risks, benefits, and imponderables. American Heart Association lifestyle changes and behavioral modification discussed. All questions answered in detail. Patient appreciative of care.       I " spent 85 minutes in the professional and overall care of this patient.      Maricarmen Andre MD

## 2024-10-16 NOTE — Clinical Note
The DEFIBRILLATOR, SURESCAN ICD, DUAL CHAMBER, EVERA MRI XT DR  DF4 - [id number] device was inserted. The leads were placed into the connector and visually verified to be in correct position.

## 2024-10-16 NOTE — NURSING NOTE
Reviewed discharge instructions with patient and daughter. All questions answered. Both verbalize understanding of activity restrictions, incisional care, medications and follow-up appointments.

## 2024-10-16 NOTE — ANESTHESIA POSTPROCEDURE EVALUATION
Patient: Mario Anglin    Procedure Summary       Date: 10/16/24 Room / Location: [facility] / Virtual [facility]    Anesthesia Start: 1058 Anesthesia Stop: 1105    Procedure: ICD - Dual Generator Change Out (Left) Diagnosis:       Ventricular tachycardia (Multi)      Implantable cardioverter-defibrillator (ICD) at end of battery life      (Ventricular tachycardia (Multi) [I47.20])      (Implantable cardioverter-defibrillator (ICD) at end of battery life [Z45.02])    Providers: Maricarmen Andre MD Responsible Provider: Juan Pablo Bond MD    Anesthesia Type: MAC ASA Status: 3            Anesthesia Type: MAC    Vitals Value Taken Time   /58 10/16/24 1118   Temp 36 10/16/24 1118   Pulse 53 10/16/24 1118   Resp 16 10/16/24 1118   SpO2 96 10/16/24 1118       Anesthesia Post Evaluation    Patient location during evaluation: PACU  Patient participation: complete - patient participated  Level of consciousness: awake  Pain score: 0  Pain management: adequate  Airway patency: patent  Cardiovascular status: acceptable, hemodynamically stable, blood pressure returned to baseline and stable  Respiratory status: acceptable, face mask, nonlabored ventilation and spontaneous ventilation  Hydration status: acceptable  Postoperative Nausea and Vomiting: none        There were no known notable events for this encounter.

## 2024-10-17 ENCOUNTER — SPECIALTY PHARMACY (OUTPATIENT)
Dept: PHARMACY | Facility: CLINIC | Age: 89
End: 2024-10-17

## 2024-10-17 PROCEDURE — RXMED WILLOW AMBULATORY MEDICATION CHARGE

## 2024-10-18 ENCOUNTER — PHARMACY VISIT (OUTPATIENT)
Dept: PHARMACY | Facility: CLINIC | Age: 89
End: 2024-10-18
Payer: COMMERCIAL

## 2024-10-23 ENCOUNTER — APPOINTMENT (OUTPATIENT)
Dept: CARDIOLOGY | Facility: CLINIC | Age: 89
End: 2024-10-23
Payer: MEDICARE

## 2024-10-23 VITALS
BODY MASS INDEX: 25.27 KG/M2 | SYSTOLIC BLOOD PRESSURE: 140 MMHG | HEART RATE: 60 BPM | WEIGHT: 161 LBS | DIASTOLIC BLOOD PRESSURE: 66 MMHG | HEIGHT: 67 IN | TEMPERATURE: 98.3 F

## 2024-10-23 DIAGNOSIS — R55 NEAR SYNCOPE: ICD-10-CM

## 2024-10-23 DIAGNOSIS — Z45.02 IMPLANTABLE CARDIOVERTER-DEFIBRILLATOR (ICD) AT END OF BATTERY LIFE: ICD-10-CM

## 2024-10-23 DIAGNOSIS — Z95.810 AICD (AUTOMATIC CARDIOVERTER/DEFIBRILLATOR) PRESENT: ICD-10-CM

## 2024-10-23 PROCEDURE — 99024 POSTOP FOLLOW-UP VISIT: CPT | Performed by: INTERNAL MEDICINE

## 2024-10-23 NOTE — PROGRESS NOTES
"Patient here for 1 week Wound check ordered by Dr. Thai VELASCO due to ICD dual chamber generator change. Patient's wound covered by steri strips, insertion site dry to the touch, very minor healing ecchymosis noted around site. No antibiotic taken. Medication list Updated verbally.No cardiac complaints.     To Dr. Thai VELASCO to  read        Vitals:    10/23/24 1101   BP: 140/66   BP Location: Right arm   Patient Position: Sitting   Pulse: 60   Temp: 36.8 °C (98.3 °F)   Weight: 73 kg (161 lb)   Height: 1.702 m (5' 7\")        "

## 2024-11-11 ENCOUNTER — HOSPITAL ENCOUNTER (OUTPATIENT)
Dept: CARDIOLOGY | Facility: HOSPITAL | Age: 89
Discharge: HOME | End: 2024-11-11
Payer: COMMERCIAL

## 2024-11-11 DIAGNOSIS — Z95.810 AICD (AUTOMATIC CARDIOVERTER/DEFIBRILLATOR) PRESENT: ICD-10-CM

## 2024-11-11 DIAGNOSIS — Z95.810 AICD (AUTOMATIC CARDIOVERTER/DEFIBRILLATOR) PRESENT: Primary | ICD-10-CM

## 2024-11-11 DIAGNOSIS — I47.20 VENTRICULAR TACHYCARDIA (MULTI): ICD-10-CM

## 2024-11-25 ENCOUNTER — SPECIALTY PHARMACY (OUTPATIENT)
Dept: PHARMACY | Facility: CLINIC | Age: 89
End: 2024-11-25

## 2024-11-25 PROCEDURE — RXMED WILLOW AMBULATORY MEDICATION CHARGE

## 2024-11-26 ENCOUNTER — APPOINTMENT (OUTPATIENT)
Dept: CARDIOLOGY | Facility: CLINIC | Age: 89
End: 2024-11-26
Payer: COMMERCIAL

## 2024-11-26 VITALS
HEIGHT: 67 IN | WEIGHT: 161 LBS | HEART RATE: 68 BPM | DIASTOLIC BLOOD PRESSURE: 60 MMHG | BODY MASS INDEX: 25.27 KG/M2 | SYSTOLIC BLOOD PRESSURE: 112 MMHG

## 2024-11-26 DIAGNOSIS — I42.8 NONISCHEMIC CARDIOMYOPATHY (MULTI): ICD-10-CM

## 2024-11-26 DIAGNOSIS — I35.1 AORTIC VALVE REGURGITATION, NONRHEUMATIC: ICD-10-CM

## 2024-11-26 DIAGNOSIS — I07.1 TRICUSPID VALVE INSUFFICIENCY, UNSPECIFIED ETIOLOGY: ICD-10-CM

## 2024-11-26 DIAGNOSIS — Z78.9 NEVER SMOKED CIGARETTES: ICD-10-CM

## 2024-11-26 DIAGNOSIS — Z95.810 AICD (AUTOMATIC CARDIOVERTER/DEFIBRILLATOR) PRESENT: ICD-10-CM

## 2024-11-26 DIAGNOSIS — Z45.02 IMPLANTABLE CARDIOVERTER-DEFIBRILLATOR (ICD) AT END OF BATTERY LIFE: ICD-10-CM

## 2024-11-26 DIAGNOSIS — I10 ESSENTIAL HYPERTENSION, BENIGN: ICD-10-CM

## 2024-11-26 DIAGNOSIS — I27.20 PULMONARY HYPERTENSION (MULTI): ICD-10-CM

## 2024-11-26 DIAGNOSIS — I34.0 NONRHEUMATIC MITRAL VALVE REGURGITATION: ICD-10-CM

## 2024-11-26 DIAGNOSIS — I47.20 VENTRICULAR TACHYCARDIA (MULTI): ICD-10-CM

## 2024-11-26 DIAGNOSIS — I50.22 CHRONIC SYSTOLIC HEART FAILURE, ACC/AHA STAGE C: Primary | ICD-10-CM

## 2024-11-26 DIAGNOSIS — I49.3 PVC (PREMATURE VENTRICULAR CONTRACTION): ICD-10-CM

## 2024-11-26 DIAGNOSIS — E78.2 MIXED HYPERLIPIDEMIA: ICD-10-CM

## 2024-11-26 NOTE — PROGRESS NOTES
Subjective   Mario Anglin is a 89 y.o. male       Chief Complaint    Annual Exam          HPI        Patient is here for follow-up to management for chronic systolic heart failure, history of syncope status post AICD implantation, hypertension and hyperlipidemia.  Since last time I saw him he underwent AICD replacement.  He reports he feels well.  He denies complaint of chest pain, palpitation, lightheadedness, dizziness or syncope.  Currently appear in functional class II.  His recent lab work noted and reviewed with him.  His recent device check carotid Doppler all noted and reviewed with him.    ASSESSMENT:      1. Chronic systolic heart failure due to nonischemic cardiomyopathy currently functional class II. He is on good medical therapy and compensated  2. History of syncope with inducible ventricular tachycardia, status post automatic implanted cardioverter defibrillator implant with no recent documentation of significant tachyarrhythmia and/or device discharge. He continues his long-term follow-up with our EP department he did undergo generator replacement recently  3. Hypertension, controlled.  4. Hyperlipidemia.  Labs will be ordered  5. Mildly overweight. With minor weight loss.  BMI down to 25  6. Mild atherosclerotic coronary disease based on remote heart cath  7. previous treatment for a left arm DVT attributed to IV site. Resolved  8. Prior GI bleed with no clear etiology was found  9. Very minor symptoms of dizziness related to orthostatic hypotension resolved  10.  Mild aortic regurgitation  11. Moderate severe mitral regurgitation evaluated by surgery at [facility] recommended conservative approach considering lack of symptoms and good functional status  12. Previous documentation secondary pulmonary hypertension improved based on recent echo     RECOMMENDATION:     1. The patient counseled regarding losing weight, exercise, and risk factor adjustment.  2. The patient was advised to  "continue present medical therapy. But I advised him if his lightheadedness worsened to notify me and will consider reducing his heart failure therapy  3. We will see the patient back in the office in 1 year with plan to repeat his lab work  4. I I reviewed his recent hospitalization record with him  5. I advised him to continue his follow-up with our EP department  Review of Systems   All other systems reviewed and are negative.           Vitals:    11/26/24 1355   BP: 112/60   BP Location: Left arm   Patient Position: Sitting   Pulse: 68   Weight: 73 kg (161 lb)   Height: 1.702 m (5' 7\")        Objective   Physical Exam  Constitutional:       Appearance: Normal appearance.   HENT:      Nose: Nose normal.   Neck:      Vascular: No carotid bruit.   Cardiovascular:      Rate and Rhythm: Normal rate.      Pulses: Normal pulses.      Heart sounds: Normal heart sounds.   Pulmonary:      Effort: Pulmonary effort is normal.   Abdominal:      General: Bowel sounds are normal.      Palpations: Abdomen is soft.   Musculoskeletal:         General: Normal range of motion.      Cervical back: Normal range of motion.      Right lower leg: No edema.      Left lower leg: No edema.   Skin:     General: Skin is warm and dry.   Neurological:      General: No focal deficit present.      Mental Status: He is alert.   Psychiatric:         Mood and Affect: Mood normal.         Behavior: Behavior normal.         Thought Content: Thought content normal.         Judgment: Judgment normal.         Allergies  Patient has no known allergies.     Current Medications    Current Outpatient Medications:     aspirin 81 mg EC tablet, Take 1 tablet (81 mg) by mouth once daily. Hold for 1 week and resume on 10/23/24, Disp: , Rfl:     atorvastatin (Lipitor) 10 mg tablet, Take 1 tablet (10 mg) by mouth once daily at bedtime., Disp: 90 tablet, Rfl: 3    celecoxib (CeleBREX) 200 mg capsule, Take 1 capsule (200 mg) by mouth once daily., Disp: , Rfl:     " cholecalciferol (Vitamin D-3) 25 MCG (1000 UT) capsule, Take 2 capsules (50 mcg) by mouth once daily., Disp: , Rfl:     dapagliflozin propanediol (Farxiga) 10 mg, TAKE ONE (1) TABLET BY MOUTH ONCE DAILY., Disp: 90 tablet, Rfl: 3    furosemide (Lasix) 20 mg tablet, Take 1 tablet (20 mg) by mouth if needed (for swelling)., Disp: , Rfl:     magnesium oxide (Mag-Ox) 400 mg (241.3 mg magnesium) tablet, Take 1 tablet (400 mg) by mouth once daily., Disp: 90 tablet, Rfl: 3    metoprolol succinate XL (Toprol-XL) 50 mg 24 hr tablet, Take 1 tablet (50 mg) by mouth once daily. Do not crush or chew., Disp: 90 tablet, Rfl: 3    omeprazole (PriLOSEC) 20 mg DR capsule, Take 1 capsule (20 mg) by mouth once daily in the morning. Take before meals., Disp: , Rfl:     potassium chloride ER (Micro-K) 10 mEq ER capsule, Take 2 capsules (20 mEq) by mouth once daily as needed. Take one tablet daily with Furosemide, Disp: , Rfl:     sacubitriL-valsartan (Entresto) 49-51 mg tablet, Take 1 tablet by mouth 2 times a day., Disp: 180 tablet, Rfl: 3    spironolactone (Aldactone) 25 mg tablet, Take 1 tablet (25 mg) by mouth once daily., Disp: 90 tablet, Rfl: 3    vitamin E mixed 400 unit capsule, Take 1 capsule by mouth once daily. Hold for 1 week and resume on 10/23/24, Disp: , Rfl:                      Assessment/Plan   1. Chronic systolic heart failure, ACC/AHA stage C  Follow Up In Cardiology    Follow Up In Cardiology      2. Nonrheumatic mitral valve regurgitation  Follow Up In Cardiology      3. Aortic valve regurgitation, nonrheumatic  Follow Up In Cardiology      4. Implantable cardioverter-defibrillator (ICD) at end of battery life        5. Ventricular tachycardia (Multi)        6. Tricuspid valve insufficiency, unspecified etiology        7. PVC (premature ventricular contraction)        8. Pulmonary hypertension (Multi)        9. Nonischemic cardiomyopathy (Multi)        10. Mixed hyperlipidemia  Alanine Aminotransferase    Aspartate  Aminotransferase    Lipid Panel    Alanine Aminotransferase    Aspartate Aminotransferase    Lipid Panel      11. Essential hypertension, benign        12. AICD (automatic cardioverter/defibrillator) present        13. BMI 25.0-25.9,adult        14. Never smoked cigarettes                 Scribe Attestation  By signing my name below, I, Mikayla ANDRES LPN  , Scribe   attest that this documentation has been prepared under the direction and in the presence of Monica Donahue MD.     Provider Attestation - Scribe documentation    All medical record entries made by the Scribe were at my direction and personally dictated by me. I have reviewed the chart and agree that the record accurately reflects my personal performance of the history, physical exam, discussion and plan.

## 2024-11-26 NOTE — PATIENT INSTRUCTIONS
Please bring all medicines, vitamins, and herbal supplements with you when you come to the office.    Prescriptions will not be filled unless you are compliant with your follow up appointments or have a follow up appointment scheduled as per instruction of your physician. Refills should be requested at the time of your visit.     Pacemaker/Defibrillator follow up per routine     BMI was above normal measurement. Current weight: 73 kg (161 lb)  Weight change since last visit (-) denotes wt loss 0 lbs   Weight loss needed to achieve BMI 25: 1.7 Lbs  Weight loss needed to achieve BMI 30: -30.1 Lbs  Provided instructions on dietary changes  Provided instructions on exercise.

## 2024-11-27 ENCOUNTER — PHARMACY VISIT (OUTPATIENT)
Dept: PHARMACY | Facility: CLINIC | Age: 89
End: 2024-11-27
Payer: COMMERCIAL

## 2025-01-01 DIAGNOSIS — E78.2 MIXED HYPERLIPIDEMIA: ICD-10-CM

## 2025-01-03 RX ORDER — ATORVASTATIN CALCIUM 10 MG/1
10 TABLET, FILM COATED ORAL NIGHTLY
Qty: 90 TABLET | Refills: 3 | Status: SHIPPED | OUTPATIENT
Start: 2025-01-03

## 2025-01-20 ENCOUNTER — HOSPITAL ENCOUNTER (OUTPATIENT)
Dept: CARDIOLOGY | Facility: HOSPITAL | Age: 89
Discharge: HOME | End: 2025-01-20
Payer: MEDICARE

## 2025-01-20 DIAGNOSIS — I47.20 VENTRICULAR TACHYCARDIA (MULTI): ICD-10-CM

## 2025-01-20 DIAGNOSIS — Z95.810 AICD (AUTOMATIC CARDIOVERTER/DEFIBRILLATOR) PRESENT: ICD-10-CM

## 2025-01-20 PROCEDURE — 93296 REM INTERROG EVL PM/IDS: CPT

## 2025-01-23 ENCOUNTER — SPECIALTY PHARMACY (OUTPATIENT)
Dept: PHARMACY | Facility: CLINIC | Age: 89
End: 2025-01-23

## 2025-01-23 PROCEDURE — RXMED WILLOW AMBULATORY MEDICATION CHARGE

## 2025-02-08 ENCOUNTER — PHARMACY VISIT (OUTPATIENT)
Dept: PHARMACY | Facility: CLINIC | Age: 89
End: 2025-02-08
Payer: COMMERCIAL

## 2025-02-17 ENCOUNTER — SPECIALTY PHARMACY (OUTPATIENT)
Dept: PHARMACY | Facility: CLINIC | Age: 89
End: 2025-02-17

## 2025-02-17 PROCEDURE — RXMED WILLOW AMBULATORY MEDICATION CHARGE

## 2025-02-19 ENCOUNTER — PHARMACY VISIT (OUTPATIENT)
Dept: PHARMACY | Facility: CLINIC | Age: 89
End: 2025-02-19
Payer: COMMERCIAL

## 2025-03-06 DIAGNOSIS — I50.20 HFREF (HEART FAILURE WITH REDUCED EJECTION FRACTION): ICD-10-CM

## 2025-03-06 RX ORDER — DAPAGLIFLOZIN 10 MG/1
10 TABLET, FILM COATED ORAL DAILY
Qty: 90 TABLET | Refills: 3 | Status: CANCELLED | OUTPATIENT
Start: 2025-03-06 | End: 2026-03-05

## 2025-04-21 NOTE — PROGRESS NOTES
"  Patient ID: Mario Anglin is a 89 y.o. male who presents for Follow-up ([facility] discharge with device check).  History of Present Illness  The patient, with a history of congestive heart failure, high blood pressure, and a defibrillator for ventricular tachycardia, cardiomyopathy, and atrial fibrillation, presents with low blood pressure. He denies recent medication changes and typically takes his medications in the morning.  He and I reviewed his blood pressure log from home, his blood pressures typically higher than what was initially recorded in the office.    The patient's defibrillator has recorded a couple of episodes of fast rhythm, including one instance of atrial fibrillation lasting 21.25 hours. He was not aware of these episodes and is currently only on low-dose aspirin, which does not provide sufficient protection against stroke in the context of atrial fibrillation.    The patient has a history of a gastrointestinal bleed, but this was a long time ago and he has not had any recent issues. He is very active and feels as good now as he did ten years ago.    PMHx:  See list    PSHx:  See list    Social Hx:  Social History[1]    Family Hx:  Family History[2]    Review of Systems   Constitutional: Negative for malaise/fatigue.   Cardiovascular: Negative.  Negative for claudication, cyanosis, irregular heartbeat, leg swelling, near-syncope, orthopnea, paroxysmal nocturnal dyspnea and syncope.   Respiratory:  Negative for cough, shortness of breath, snoring and wheezing.    All other systems reviewed and are negative.      Objective     /56 (BP Location: Left arm, Patient Position: Sitting)   Pulse 69   Ht 1.702 m (5' 7\")   Wt 74.8 kg (165 lb)   BMI 25.84 kg/m²        LABS:  CBC: No results found for: \"WBC\", \"RBC\", \"HGB\", \"HCT\", \"MCV\", \"MCH\", \"MCHC\", \"RDW\", \"PLT\", \"MPV\"  CBC with Differential:  No results found for: \"WBC\", \"RBC\", \"HGB\", \"HCT\", \"PLT\", \"MCV\", \"MCH\", \"MCHC\", \"RDW\", \"NRBC\", \"SEGSPCT\", " "\"BANDSPCT\", \"BLASTSPCT\", \"METASPCT\", \"LYMPHOPCT\", \"PROMYELOPCT\", \"MONOPCT\", \"MYELOPCT\", \"EOSPCT\", \"BASOPCT\", \"MONOSABS\", \"LYMPHSABS\", \"EOSABS\", \"BASOSABS\", \"DIFFTYPE\"  CMP:    Lab Results   Component Value Date     07/02/2021    K 4.4 07/02/2021     07/02/2021    CO2 29 07/02/2021    BUN 25 (H) 07/02/2021    CREATININE 1.35 (H) 07/02/2021    GLUCOSE 78 07/02/2021    CALCIUM 8.8 07/02/2021     BMP:    Lab Results   Component Value Date     07/02/2021    K 4.4 07/02/2021     07/02/2021    CO2 29 07/02/2021    BUN 25 (H) 07/02/2021    CREATININE 1.35 (H) 07/02/2021    CALCIUM 8.8 07/02/2021    GLUCOSE 78 07/02/2021     Magnesium:No results found for: \"MG\"        Constitutional:       Appearance: Normal and healthy appearance. Well-developed, overweight and not in distress.      Comments: Left sided device well healed   Neck:      Vascular: No JVR. JVD normal.   Pulmonary:      Effort: Pulmonary effort is normal.      Breath sounds: Normal breath sounds. No wheezing. No rhonchi. No rales.   Chest:      Chest wall: Not tender to palpatation.   Cardiovascular:      PMI at left midclavicular line. Normal rate. Regular rhythm. Normal S1. Normal S2.       Murmurs: There is no murmur.      No gallop.  No click. No rub.   Pulses:     Intact distal pulses.   Edema:     Peripheral edema absent.   Abdominal:      Tenderness: There is no abdominal tenderness.   Musculoskeletal: Normal range of motion.         General: No tenderness. Skin:     General: Skin is warm and dry.   Neurological:      General: No focal deficit present.      Mental Status: Alert and oriented to person, place and time.            Device check. See scanned.  ECG. See scanned.    Assessment & Plan  Paroxysmal atrial fibrillation.  New diagnosis.  ICD detected 21.25 hours of atrial fibrillation on March 2, 2025. Not on anticoagulation, only low-dose aspirin, which is insufficient for stroke prevention. CHADS2 VASc score indicates need " for anticoagulation due to congestive heart failure and hypertension. At risk for stroke due to prolonged episodes. Active with no bleeding, anemia, or frequent falls, making him a candidate for anticoagulation. Discussed stroke risk if episodes exceed 24 hours and potential for clot formation. Considered Watchman device if anticoagulation is not tolerated.  - Initiate Eliquis 5 mg twice daily as anticoagulation therapy.  - Limit aspirin to baby aspirin to minimize bleeding risk.  - Monitor for anticoagulation intolerance and consider referral to Dr. Donahue if issues arise.  - Discuss Watchman device with Dr. Funes if anticoagulation is not tolerated.    Primary prevention ICD.  Medtronic DD BB 1D4 ICD on field alert.  Chemical complication subsequent visit.  Discussed standard of care for device follow-up.  Ordered device checks.    Congestive heart failure  Congestive heart failure is well-managed, with no significant peripheral edema. Blood pressure is currently lower than usual, which is atypical.  Recheck blood pressure in office  - Recheck blood pressure regularly due to atypically low readings.  - Continue Lasix as needed for edema.  - Refill Farxiga prescription for heart failure management.    Counseled greater than 50% of the visit.  The patient and I discussed arrhythmia, ECG, shared decision making, defibrillator field alert, subsequent visit, treatment options, risk, benefits, and imponderables.  Lifestyle modifications reviewed.  All questions answered.  Patient appreciative of care  Assessment & Plan  Ventricular tachycardia (Multi)    Paroxysmal atrial fibrillation (Multi)    PVC (premature ventricular contraction)    HFrEF (heart failure with reduced ejection fraction)    AICD (automatic cardioverter/defibrillator) present    BMI 25.0-25.9,adult    Never smoked cigarettes    Encounter for medication review and counseling    Encounter to discuss treatment options    Encounter to discuss test  results    I, WARREN LEBRON LPN, AM SCRIBING FOR AND IN THE PRESENCE OF DR. VANIA VALDEZ MD, FACC, FACP, RS  I, , personally performed the services described in the documentation as scribed by the nurse in my presence, and confirm it is both accurate and complete.     Warren Lebron LPN     This medical note was created with the assistance of artificial intelligence (AI) for documentation purposes. The content has been reviewed and confirmed by the healthcare provider for accuracy and completeness. Patient consented to the use of audio recording and use of AI during their visit.     Total, 42 inclusive of review of atrial fibrillation and indications for anticoagulation       [1]   Social History  Socioeconomic History    Marital status:    Tobacco Use    Smoking status: Never    Smokeless tobacco: Never   Vaping Use    Vaping status: Never Used   Substance and Sexual Activity    Alcohol use: Never    Drug use: Never    Sexual activity: Not Currently     Social Drivers of Health     Financial Resource Strain: Low Risk  (2/3/2020)    Received from [facility]    Overall Financial Resource Strain (CARDIA)     Difficulty of Paying Living Expenses: Not very hard   Food Insecurity: No Food Insecurity (2/3/2020)    Received from [facility]    Hunger Vital Sign     Worried About Running Out of Food in the Last Year: Never true     Ran Out of Food in the Last Year: Never true   Transportation Needs: No Transportation Needs (2/3/2020)    Received from [facility]    PRAPARE - Transportation     Lack of Transportation (Medical): No     Lack of Transportation (Non-Medical): No   [2]   Family History  Problem Relation Name Age of Onset    No Known Problems Mother      Cancer Father Vikas virgen

## 2025-04-22 ENCOUNTER — SPECIALTY PHARMACY (OUTPATIENT)
Dept: PHARMACY | Facility: CLINIC | Age: 89
End: 2025-04-22

## 2025-04-25 ENCOUNTER — APPOINTMENT (OUTPATIENT)
Dept: CARDIOLOGY | Facility: CLINIC | Age: 89
End: 2025-04-25
Payer: COMMERCIAL

## 2025-04-25 ENCOUNTER — HOSPITAL ENCOUNTER (OUTPATIENT)
Dept: CARDIOLOGY | Facility: HOSPITAL | Age: 89
Discharge: HOME | End: 2025-04-25
Payer: MEDICARE

## 2025-04-25 VITALS
HEART RATE: 69 BPM | DIASTOLIC BLOOD PRESSURE: 56 MMHG | WEIGHT: 165 LBS | SYSTOLIC BLOOD PRESSURE: 114 MMHG | BODY MASS INDEX: 25.9 KG/M2 | HEIGHT: 67 IN

## 2025-04-25 DIAGNOSIS — Z71.89 ENCOUNTER FOR MEDICATION REVIEW AND COUNSELING: ICD-10-CM

## 2025-04-25 DIAGNOSIS — Z71.89 ENCOUNTER TO DISCUSS TREATMENT OPTIONS: ICD-10-CM

## 2025-04-25 DIAGNOSIS — Z95.810 AICD (AUTOMATIC CARDIOVERTER/DEFIBRILLATOR) PRESENT: ICD-10-CM

## 2025-04-25 DIAGNOSIS — Z71.2 ENCOUNTER TO DISCUSS TEST RESULTS: ICD-10-CM

## 2025-04-25 DIAGNOSIS — I49.3 PVC (PREMATURE VENTRICULAR CONTRACTION): ICD-10-CM

## 2025-04-25 DIAGNOSIS — I50.20 HFREF (HEART FAILURE WITH REDUCED EJECTION FRACTION): ICD-10-CM

## 2025-04-25 DIAGNOSIS — I48.0 PAROXYSMAL ATRIAL FIBRILLATION (MULTI): ICD-10-CM

## 2025-04-25 DIAGNOSIS — I47.20 VENTRICULAR TACHYCARDIA (MULTI): ICD-10-CM

## 2025-04-25 DIAGNOSIS — Z78.9 NEVER SMOKED CIGARETTES: ICD-10-CM

## 2025-04-25 PROCEDURE — 1036F TOBACCO NON-USER: CPT | Performed by: INTERNAL MEDICINE

## 2025-04-25 PROCEDURE — 93283 PRGRMG EVAL IMPLANTABLE DFB: CPT

## 2025-04-25 PROCEDURE — 1159F MED LIST DOCD IN RCRD: CPT | Performed by: INTERNAL MEDICINE

## 2025-04-25 PROCEDURE — 3078F DIAST BP <80 MM HG: CPT | Performed by: INTERNAL MEDICINE

## 2025-04-25 PROCEDURE — 3074F SYST BP LT 130 MM HG: CPT | Performed by: INTERNAL MEDICINE

## 2025-04-25 PROCEDURE — 93000 ELECTROCARDIOGRAM COMPLETE: CPT | Mod: DISTINCT PROCEDURAL SERVICE | Performed by: INTERNAL MEDICINE

## 2025-04-25 PROCEDURE — 99215 OFFICE O/P EST HI 40 MIN: CPT | Performed by: INTERNAL MEDICINE

## 2025-04-25 RX ORDER — DAPAGLIFLOZIN 10 MG/1
10 TABLET, FILM COATED ORAL DAILY
Qty: 90 TABLET | Refills: 3 | Status: SHIPPED | OUTPATIENT
Start: 2025-04-25 | End: 2026-04-24

## 2025-04-25 RX ORDER — MULTIVITAMIN/IRON/FOLIC ACID 18MG-0.4MG
1 TABLET ORAL DAILY
COMMUNITY

## 2025-04-25 ASSESSMENT — ENCOUNTER SYMPTOMS
SHORTNESS OF BREATH: 0
COUGH: 0
ORTHOPNEA: 0
CARDIOVASCULAR NEGATIVE: 1
SYNCOPE: 0
NEAR-SYNCOPE: 0
CLAUDICATION: 0
PND: 0
IRREGULAR HEARTBEAT: 0
SNORING: 0
WHEEZING: 0

## 2025-04-25 NOTE — PATIENT INSTRUCTIONS
On your device check, you had more than 20 hours consecutively where you were in atrial fibrillation, so you need to begin a blood thinner to prevent a stroke from a blood clot.  Follow up with Dr. Andre in 12 months, with and in- clinic device check same day  Remote device checks at 3, 6, and 9 months  BEGIN Eliquis 5mg twice a day  Continue same medications and treatments.   Patient educated on proper medication use.   Patient educated on risk factor modification.   Please bring any lab results from other providers / physicians to your next appointment.     Please bring all medicines, vitamins, and herbal supplements with you when you come to the office.     Prescriptions will not be filled unless you are compliant with your follow up appointments or have a follow up appointment scheduled as per instruction of your physician. Refills should be requested at the time of your visit.

## 2025-04-28 ENCOUNTER — SPECIALTY PHARMACY (OUTPATIENT)
Dept: PHARMACY | Facility: CLINIC | Age: 89
End: 2025-04-28

## 2025-04-28 PROCEDURE — RXMED WILLOW AMBULATORY MEDICATION CHARGE

## 2025-04-29 ENCOUNTER — SPECIALTY PHARMACY (OUTPATIENT)
Dept: PHARMACY | Facility: CLINIC | Age: 89
End: 2025-04-29

## 2025-04-29 ENCOUNTER — PHARMACY VISIT (OUTPATIENT)
Dept: PHARMACY | Facility: CLINIC | Age: 89
End: 2025-04-29
Payer: COMMERCIAL

## 2025-04-29 PROCEDURE — RXMED WILLOW AMBULATORY MEDICATION CHARGE

## 2025-05-09 ENCOUNTER — PHARMACY VISIT (OUTPATIENT)
Dept: PHARMACY | Facility: CLINIC | Age: 89
End: 2025-05-09
Payer: COMMERCIAL

## 2025-05-13 ENCOUNTER — SPECIALTY PHARMACY (OUTPATIENT)
Dept: PHARMACY | Facility: CLINIC | Age: 89
End: 2025-05-13

## 2025-05-13 PROCEDURE — RXMED WILLOW AMBULATORY MEDICATION CHARGE

## 2025-05-14 ENCOUNTER — PHARMACY VISIT (OUTPATIENT)
Dept: PHARMACY | Facility: CLINIC | Age: 89
End: 2025-05-14
Payer: COMMERCIAL

## 2025-07-02 DIAGNOSIS — I50.20 HFREF (HEART FAILURE WITH REDUCED EJECTION FRACTION): ICD-10-CM

## 2025-07-02 RX ORDER — METOPROLOL SUCCINATE 50 MG/1
50 TABLET, EXTENDED RELEASE ORAL DAILY
Qty: 90 TABLET | Refills: 0 | Status: SHIPPED | OUTPATIENT
Start: 2025-07-02

## 2025-07-03 ENCOUNTER — OFFICE VISIT (OUTPATIENT)
Dept: CARDIOLOGY | Facility: CLINIC | Age: 89
End: 2025-07-03
Payer: MEDICARE

## 2025-07-03 VITALS
HEIGHT: 67 IN | DIASTOLIC BLOOD PRESSURE: 54 MMHG | HEART RATE: 79 BPM | TEMPERATURE: 98.1 F | SYSTOLIC BLOOD PRESSURE: 120 MMHG | WEIGHT: 166 LBS | BODY MASS INDEX: 26.06 KG/M2

## 2025-07-03 DIAGNOSIS — I50.20 HFREF (HEART FAILURE WITH REDUCED EJECTION FRACTION): Primary | ICD-10-CM

## 2025-07-03 DIAGNOSIS — E87.5 HYPERKALEMIA: ICD-10-CM

## 2025-07-03 DIAGNOSIS — I50.20 HFREF (HEART FAILURE WITH REDUCED EJECTION FRACTION): ICD-10-CM

## 2025-07-03 PROBLEM — J96.01 ACUTE RESPIRATORY FAILURE WITH HYPOXIA: Status: RESOLVED | Noted: 2020-02-05 | Resolved: 2025-07-03

## 2025-07-03 PROCEDURE — 1159F MED LIST DOCD IN RCRD: CPT | Performed by: INTERNAL MEDICINE

## 2025-07-03 PROCEDURE — 3074F SYST BP LT 130 MM HG: CPT | Performed by: INTERNAL MEDICINE

## 2025-07-03 PROCEDURE — 1036F TOBACCO NON-USER: CPT | Performed by: INTERNAL MEDICINE

## 2025-07-03 PROCEDURE — 99215 OFFICE O/P EST HI 40 MIN: CPT | Performed by: INTERNAL MEDICINE

## 2025-07-03 PROCEDURE — G2211 COMPLEX E/M VISIT ADD ON: HCPCS | Performed by: INTERNAL MEDICINE

## 2025-07-03 PROCEDURE — 3078F DIAST BP <80 MM HG: CPT | Performed by: INTERNAL MEDICINE

## 2025-07-03 RX ORDER — CALCIUM CARBONATE 300MG(750)
400 TABLET,CHEWABLE ORAL DAILY
COMMUNITY

## 2025-07-03 NOTE — PATIENT INSTRUCTIONS
To reach Dr. Chen's office please call [phone] ([facility]). Fax [phone]. Call [phone] to schedule an appointment. You may also contact the HF RNs at [email or web address]    Thank you for coming to your appointment today. If you have any questions or need cardiac medication refills, please call the Heart Failure Office at [phone] option [phone].     RE-START Entresto 49-51mg one tablet twice daily  STOP Spironolactone   Follow up with Dr. Chen in one year. Sooner if needed

## 2025-07-03 NOTE — PROGRESS NOTES
"    Heart Failure Cardiology    Mario Anglin is a 89 y.o. male from [place], retired  (retired at age of 84). Lives alone, wife passed away 2019. Lives in a condo. Here with his adult daughter Rosales.     He was just hospitalized at [facility] in [place] on Sunday, June 28. He presented to this hospital after several days of diarrhea and abdominal pain. Apparently he had a UTI and \"blood infection\" per patient and his daughter (I don't have records). His spironolactone and Entresto were stopped.    His most recent creatinine was 1.63, and potassium 5.3.    Exam: /54 (BP Location: Right arm, Patient Position: Sitting, BP Cuff Size: Adult)   Pulse 79   Temp 36.7 °C (98.1 °F) (Oral)   Ht 1.702 m (5' 7\")   Wt 75.3 kg (166 lb)   BMI 26.00 kg/m²   Well appearing  CTA  RRR  No LE edema    Current Outpatient Medications   Medication Instructions    aspirin 81 mg, oral, Daily, Hold for 1 week and resume on 10/23/24    atorvastatin (LIPITOR) 10 mg, oral, Nightly    b complex 0.4 mg tablet 1 tablet, Daily    BIOTIN ORAL Take by mouth.    celecoxib (CeleBREX) 200 mg capsule 1 capsule, Daily    cholecalciferol (VITAMIN D-3) 50 mcg, Daily    dapagliflozin propanediol (Farxiga) 10 mg tablet TAKE ONE (1) TABLET BY MOUTH ONCE DAILY.    Eliquis 5 mg, oral, 2 times daily    furosemide (LASIX) 20 mg, As needed    magnesium oxide (MAG-OX) 400 mg, Daily    metoprolol succinate XL (TOPROL-XL) 50 mg, oral, Daily    omeprazole (PRILOSEC) 20 mg, Daily before breakfast    potassium chloride ER (Micro-K) 10 mEq ER capsule 20 mEq, Daily PRN    sacubitriL-valsartan (Entresto) 49-51 mg tablet 1 tablet, oral, 2 times daily    spironolactone (ALDACTONE) 25 mg, oral, Daily    vitamin E mixed 400 unit capsule 1 capsule, oral, Daily, Hold for 1 week and resume on 10/23/24     Past medical history (non-cardiac):  -- Hypertension  -- History of diverticulitis    Cardiovascular history:  -- Non-ischemic " cardiomypathy (prior stress tests negative for ischemia); cause unclear  -- Stage C HFrEF, NYHA class II; LVEF 20-25%, +aortic regurgitation (mild to moderaet AI), +MR  -- He was previously offered MV clip with Dr. Wooten, but he declined  -- Bradycardia 2015 with subsequent implantation of PPM/ICD    Assessment: 89 y.o. WM with nonischemic cardiomyopathy and HFrEF stage C, stable and doing quite well.  He seems to have had recurrent episodes of hyperkalemia, especially in setting of recent hospitalization.  I favor completely stopping spironolactone going forward.    Plan:  -- He has had fluctuating hyperkalemia, most recently K 5.6 in the hospital. It well may be that he can't tolerate spironolactone anymore.  -- I favor restarting Entresto 49/51mg bid as he was taking  -- He will need repeat blood work again in 2 weeks (his daughter will arrange for him to get it via his PCP)    Drug with monitoring for toxicity  Chronic HF    Stephane Chen MD, MPH  Advanced Heart Failure and Transplant Cardiology  [facility]  [facility]

## 2025-07-25 ENCOUNTER — SPECIALTY PHARMACY (OUTPATIENT)
Dept: PHARMACY | Facility: CLINIC | Age: 89
End: 2025-07-25

## 2025-07-25 PROCEDURE — RXMED WILLOW AMBULATORY MEDICATION CHARGE

## 2025-07-28 ENCOUNTER — PHARMACY VISIT (OUTPATIENT)
Dept: PHARMACY | Facility: CLINIC | Age: 89
End: 2025-07-28
Payer: COMMERCIAL

## 2025-07-29 ENCOUNTER — HOSPITAL ENCOUNTER (OUTPATIENT)
Dept: CARDIOLOGY | Facility: HOSPITAL | Age: 89
Discharge: HOME | End: 2025-07-29
Payer: MEDICARE

## 2025-07-29 DIAGNOSIS — Z95.810 AICD (AUTOMATIC CARDIOVERTER/DEFIBRILLATOR) PRESENT: ICD-10-CM

## 2025-07-29 DIAGNOSIS — I47.20 VENTRICULAR TACHYCARDIA (MULTI): ICD-10-CM

## 2025-07-29 PROCEDURE — 93296 REM INTERROG EVL PM/IDS: CPT

## 2025-07-29 PROCEDURE — 93295 DEV INTERROG REMOTE 1/2/MLT: CPT | Performed by: INTERNAL MEDICINE

## 2025-07-31 ENCOUNTER — APPOINTMENT (OUTPATIENT)
Dept: GASTROENTEROLOGY | Facility: HOSPITAL | Age: 89
End: 2025-07-31
Payer: MEDICARE

## 2025-07-31 ENCOUNTER — TELEPHONE (OUTPATIENT)
Dept: GASTROENTEROLOGY | Facility: HOSPITAL | Age: 89
End: 2025-07-31
Payer: MEDICARE

## 2025-07-31 DIAGNOSIS — D37.9 NEOPLASM OF UNCERTAIN BEHAVIOR OF DIGESTIVE ORGAN, UNSPECIFIED: ICD-10-CM

## 2025-07-31 DIAGNOSIS — K86.89 PANCREATIC MASS (HHS-HCC): Primary | ICD-10-CM

## 2025-07-31 NOTE — TELEPHONE ENCOUNTER
"Spoke to daughter Rosales. Patient had a CT a/p done on June 29, 2025. This was done at [facility] for diarrhea for 3 days and RLQ pain. CT a/p incidentally showed a 1.2 cm hypodensity uncinate region of the pancreas. The abdominal pain has since resolved per daughter.    Unclear what this mass is (not sure if it is cancer or a cystic mass or something else). Patient was scheduled to see me in clinic today, but he lives in [place] and has other medical co-morbidities. I spoke to one of our advanced GI attendings, Dr. Juana Rivera who felt that the patient should get additional imaging done in the form of a MRI Pancreas or CT Pancreas protocol to get further information about this lesion.     Patient has an ICD placed many years ago and it was replaced last year in Oct 2024. Per procedure report, the device implanted was \"DEFIBRILLATOR, SURESCAN ICD, DUAL CHAMBER, EVERA MRI XT   DF4 - [id number] - [id number]).     I called the [facility] MRI department and was told that they do MRIs for patients who have MRI conditional ICDs like Mr. Anglin. I will go ahead and order the MRI Pancreas and I will also order a CA 19-9 level.     All of this was communicated over telephone to patient's daughter Rosales.     Donna Napoles MD MPH   GI Fellow   [facility]   "

## 2025-08-25 ENCOUNTER — SPECIALTY PHARMACY (OUTPATIENT)
Dept: PHARMACY | Facility: CLINIC | Age: 89
End: 2025-08-25

## 2025-08-25 ENCOUNTER — PHARMACY VISIT (OUTPATIENT)
Dept: PHARMACY | Facility: CLINIC | Age: 89
End: 2025-08-25
Payer: COMMERCIAL

## 2025-08-25 PROCEDURE — RXMED WILLOW AMBULATORY MEDICATION CHARGE

## 2025-09-26 ENCOUNTER — APPOINTMENT (OUTPATIENT)
Dept: GASTROENTEROLOGY | Facility: CLINIC | Age: 89
End: 2025-09-26
Payer: MEDICARE

## 2025-11-26 ENCOUNTER — APPOINTMENT (OUTPATIENT)
Dept: CARDIOLOGY | Facility: CLINIC | Age: 89
End: 2025-11-26
Payer: COMMERCIAL

## 2026-04-28 ENCOUNTER — APPOINTMENT (OUTPATIENT)
Dept: CARDIOLOGY | Facility: CLINIC | Age: 89
End: 2026-04-28
Payer: MEDICARE

## 2026-07-07 ENCOUNTER — APPOINTMENT (OUTPATIENT)
Dept: CARDIOLOGY | Facility: CLINIC | Age: 89
End: 2026-07-07
Payer: MEDICARE

## (undated) DEVICE — HEMOSTAT, ARISTA, ABSORBABLE, 1 GRAMS

## (undated) DEVICE — ACCESSORY KIT, LEAD MDL 6056M

## (undated) DEVICE — DRESSING, AQUACEL AG, HYDROFIBER W/SILVER, 3.5 X 6 IN

## (undated) DEVICE — BLADE, CAUTERY, EXTENDED, PEAK PLASMA

## (undated) DEVICE — KIT, WRENCH, STERILE, F/LEADS

## (undated) DEVICE — ENVELOPE, ANTIBACTERIAL, AIGIS RX TYRX, ABSORBABLE, LRG

## (undated) DEVICE — HEMOSTAT, D-STAT FLOWABLE

## (undated) DEVICE — WOUND SYSTEM, DEBRIDEMENT & CLEANING, O.R DUOPAK